# Patient Record
Sex: FEMALE | HISPANIC OR LATINO | Employment: UNEMPLOYED | URBAN - METROPOLITAN AREA
[De-identification: names, ages, dates, MRNs, and addresses within clinical notes are randomized per-mention and may not be internally consistent; named-entity substitution may affect disease eponyms.]

---

## 2017-05-02 ENCOUNTER — HOSPITAL ENCOUNTER (EMERGENCY)
Facility: CLINIC | Age: 72
Discharge: HOME | End: 2017-05-02
Attending: EMERGENCY MEDICINE | Admitting: EMERGENCY MEDICINE
Payer: MEDICARE

## 2017-05-02 VITALS
DIASTOLIC BLOOD PRESSURE: 63 MMHG | WEIGHT: 162 LBS | SYSTOLIC BLOOD PRESSURE: 148 MMHG | HEIGHT: 60 IN | BODY MASS INDEX: 31.8 KG/M2 | RESPIRATION RATE: 16 BRPM | OXYGEN SATURATION: 96 % | TEMPERATURE: 98.5 F | HEART RATE: 69 BPM

## 2017-05-02 DIAGNOSIS — M54.81 OCCIPITAL NEURALGIA OF LEFT SIDE: ICD-10-CM

## 2017-05-02 PROCEDURE — 99284 EMERGENCY DEPT VISIT MOD MDM: CPT | Mod: 25

## 2017-05-02 PROCEDURE — 25000125 ZZHC RX 250: Performed by: EMERGENCY MEDICINE

## 2017-05-02 PROCEDURE — 96372 THER/PROPH/DIAG INJ SC/IM: CPT

## 2017-05-02 RX ORDER — TRIAMCINOLONE ACETONIDE 40 MG/ML
40 INJECTION, SUSPENSION INTRA-ARTICULAR; INTRAMUSCULAR ONCE
Status: COMPLETED | OUTPATIENT
Start: 2017-05-02 | End: 2017-05-02

## 2017-05-02 RX ADMIN — TRIAMCINOLONE ACETONIDE 40 MG: 40 INJECTION, SUSPENSION INTRA-ARTICULAR; INTRAMUSCULAR at 21:04

## 2017-05-02 ASSESSMENT — ENCOUNTER SYMPTOMS
HEADACHES: 1
NECK PAIN: 1

## 2017-05-02 NOTE — ED AVS SNAPSHOT
Emergency Department    [address]    [place] [address]    Phone:  [phone]    Fax:  [phone]                                       Dea Burnette   MRN: [id number]    Department:   Emergency Department   Date of Visit:  5/2/2017           Patient Information     Date Of Birth          1945        Your diagnoses for this visit were:     Occipital neuralgia of left side        You were seen by Harlan Santoyo MD.      Follow-up Information     Schedule an appointment as soon as possible for a visit with Fanta Akhtar MD.    Specialty:  Neurology    Contact information:    [facility]  [address] [facility] [address]  [place] [address]  [phone]          Discharge Instructions         * Dolor De Laura [Headache, Unspecified]    No se ha podido determinar con certeza cuál es la causa de barnes dolor de laura de hoy, veena no parece rosalba señales de rivera enfermedad seria.  Con el estrés, algunas personas tensan los músculos de los hombros, del nadia y el cuero cabelludo sin darse cuenta. Si lo hacen gina cierto tiempo, puede darse un DOLOR DE LAURA POR TENSIÓN (TENSION HEADACHE).  El DOLOR DE LAURA POR MIGRAÑA (MIGRAINE HEADACHE) se debe a cambios en el flujo sanguíneo del cerebro. Un ataque de migraña puede desencadenarse por estrés emocional, cambios hormonales gina el ciclo menstrual, anticonceptivos orales, el consumo de alcohol, ciertos alimentos que contienen tiramina (tyramine), esfuerzo de los ojos, cambios del clima, saltearse comidas, falta de sueño o dormir demasiadas horas.  Otras causas que pueden ocasionar un dolor de laura son: rivera enfermedad viral (viral illness),rivera infección de los senos paranasales (sinus infection), del oído (ear infection) o de la garganta (throat infection), dolor en los dientes o muelas (dental pain) y dolor en la mandíbula (jaw joint pain).  Cuidados En [place]:  1. Si le dieron algún calmante (analgésico [pain  medicine]) para zaki dolor de viola, no conduzca hasta barnes casa. En cambio, coordine para que otra persona lo lleve. Cuando llegue a barnes casa, intente dormir. Se sentirá mucho mejor cuando despierte.  2. Si tiene náuseas (nausea) o vómito (vomiting), siga rivera dieta liviana hasta que el dolor de viola haya desaparecido.  3. Si tiene un dolor de viola de tipo migraña, use lentes de sol cuando salga a la maral del día o se encuentre en lugares interiores con iluminación brillante, hasta que laurel síntomas se alivien. La maral rosaline y muy brillante puede empeorar zaki tipo de dolor de viola.  Programe rivera VISITA DE CONTROL con barnes médico si no empieza a sentirse mejor gina las próximas 24 horas. Si tiene evan de viola frecuentes, debería conversar sobre un plan de tratamiento con barnes médico de atención de primaria. Si está atento a los primeros signos del dolor de viola y comienza el tratamiento de inmediato, quizás pueda detener el dolor usted mismo.  Busque Prontamente Atención Médica  si algo de lo siguiente ocurre:    El dolor de viola empeora o no se kb en las próximas 24 horas.    Vómito persistente (no puede mantener los líquidos en el estómago).    Fiebre de 101 F (38.3 C) o más emiliano.    Rigidez en el nadia.    Gran somnolencia, confusión, o desmayo.    Debilidad en un brazo o rivera pierna, o en un lado de la sawyer.    Dificultades para hablar o andrés.    [id number] [facility] [address], [place] [address]. Todos los derechos reservados. Esta información no pretende sustituir la atención médica profesional. Sólo barnes médico puede diagnosticar y tratar un problema de clyde.          24 Hour Appointment Hotline       To make an appointment at any [facility] clinic, call [phone] ([phone]). If you don't have a family doctor or clinic, we will help you find one. [facility] clinics are conveniently located to serve the needs of you and your family.             Review of your medicines       Our records show that you are taking the medicines listed below. If these are incorrect, please call your family doctor or clinic.        Dose / Directions Last dose taken    ACETAMINOPHEN PO   Dose:  500 mg        Take 500 mg by mouth every 6 hours as needed.   Refills:  0        calcium-vitamin D 600-400 MG-UNIT per tablet   Commonly known as:  CALTRATE   Dose:  1 tablet        Take 1 tablet by mouth 2 times daily.   Refills:  0        PRAVASTATIN SODIUM PO   Dose:  20 mg        Take 20 mg by mouth daily.   Refills:  0                Orders Needing Specimen Collection     None      Pending Results     No orders found from 4/30/2017 to 5/3/2017.            Pending Culture Results     No orders found from 4/30/2017 to 5/3/2017.            Pending Results Instructions     If you had any lab results that were not finalized at the time of your Discharge, you can call the ED Lab Result RN at [phone]. You will be contacted by this team for any positive Lab results or changes in treatment. The nurses are available 7 days a week from 10A to 6:30P.  You can leave a message 24 hours per day and they will return your call.        Test Results From Your Hospital Stay               Clinical Quality Measure: Blood Pressure Screening     Your blood pressure was checked while you were in the emergency department today. The last reading we obtained was  BP: 142/53 . Please read the guidelines below about what these numbers mean and what you should do about them.  If your systolic blood pressure (the top number) is less than 120 and your diastolic blood pressure (the bottom number) is less than 80, then your blood pressure is normal. There is nothing more that you need to do about it.  If your systolic blood pressure (the top number) is 120-139 or your diastolic blood pressure (the bottom number) is 80-89, your blood pressure may be higher than it should be. You should have your blood pressure rechecked within a year by a  "primary care provider.  If your systolic blood pressure (the top number) is 140 or greater or your diastolic blood pressure (the bottom number) is 90 or greater, you may have high blood pressure. High blood pressure is treatable, but if left untreated over time it can put you at risk for heart attack, stroke, or kidney failure. You should have your blood pressure rechecked by a primary care provider within the next 4 weeks.  If your provider in the emergency department today gave you specific instructions to follow-up with your doctor or provider even sooner than that, you should follow that instruction and not wait for up to 4 weeks for your follow-up visit.        Thank you for choosing [facility]       Thank you for choosing [facility] for your care. Our goal is always to provide you with excellent care. Hearing back from our patients is one way we can continue to improve our services. Please take a few minutes to complete the written survey that you may receive in the mail after you visit with us. Thank you!        [email or web address] Information     [employer] lets you send messages to your doctor, view your test results, renew your prescriptions, schedule appointments and more. To sign up, go to [email or web address] . Click on \"Log in\" on the left side of the screen, which will take you to the Welcome page. Then click on \"Sign up Now\" on the right side of the page.     You will be asked to enter the access code listed below, as well as some personal information. Please follow the directions to create your username and password.     Your access code is: [email or web address]  Expires: 2017  9:05 PM     Your access code will  in 90 days. If you need help or a new code, please call your [facility] clinic or [phone].        Care EveryWhere ID     This is your Care EveryWhere ID. This could be used by other organizations to access your [facility] medical records  [id number]        After Visit Summary       This is your " record. Keep this with you and show to your community pharmacist(s) and doctor(s) at your next visit.

## 2017-05-03 NOTE — ED NOTES
The Pt presents to the ED with c/o a left sided headache that began today at 1500 hrs. She denies photophobia or nausea. She currently rates her pain 5/10.

## 2017-05-03 NOTE — ED PROVIDER NOTES
History     Chief Complaint:  Headache       HPI History translated through the patient's daughter, present at bedside.     Dea Burnette is a 72 year old female with a history of HTN and HLD who presents for evaluation of a headache. About 4 hours ago, the patient developed a sharp, throbbing left sided headache, radiating into her left ear and the left side of her neck, prompting her visit to the ED. Here, she states that the pain is worst just behind the left ear and inhibits her ability to turn her head to the left. The patient denies any previous episodes of similar pain or recent illnesses, however the daughter mentions that the mother was previously complaining of a voice change.     Allergies:  The patient has no known drug allergies.      Medications:    Acetaminophen po  Calcium-vitamin d (caltrate) 600-400 mg-unit per tablet  Pravastatin sodium po    Past Medical History:    Back pain   Cholelithiasis   Diverticulosis   Hyperlipidemia   Hypertension    Noninfectious ileitis   Obesity   Osteoarthritis   Osteopenia   Screen for colon cancer   Thyroid function study abnormality   Vitamin D deficiency     Past Surgical History:      Phacoemulsification Clear Cornea With Standard Intraocular Lens Implant, Right x2    Family History:    History reviewed.  No significant family history.      Social History:  Relationship status:   Tobacco use: Neg  Alcohol use: Neg  The patient presents with her daughter.       Review of Systems   HENT: Positive for ear pain.    Musculoskeletal: Positive for neck pain.   Neurological: Positive for headaches.   All other systems reviewed and are negative.    Physical Exam   First Vitals:  BP: 177/65  Pulse: 70  Temp: 98.5  F (36.9  C)  Resp: 18  Height: 152.4 cm (5')  Weight: 73.5 kg (162 lb)  SpO2: 98 %    Physical Exam    GENERAL: well developed, pleasant. Looks uncomfortable  HEAD: atraumatic. Locates pain to left occipital region.    EYES: pupils  reactive, extraocular muscles intact, conjunctivae normal  ENT:  mucus membranes moist  NECK:  trachea midline, normal range of motion  RESPIRATORY: no tachypnea, breath sounds clear to auscultation   CVS: normal S1/S2, no murmurs, intact distal pulses  ABDOMEN: soft, nontender, nondistention  MUSCULOSKELETAL: no deformities  SKIN: warm and dry, no acute rashes or ulceration. No herpetic lesions  NEURO: GCS 15, cranial nerves intact, alert and oriented x3  PSYCH:  Mood/affect normal     Emergency Department Course   Interventions:  2104: Kenalog-40, 40 mg, IM    Emergency Department Course:  Nursing notes and vitals reviewed.  I performed an exam of the patient as documented above.  The above workup was undertaken.  1920: I performed an occipital nerve block.   2045: I rechecked the patient and discussed results.    Findings and plan explained to the Patient. Patient discharged home, status improved, with instructions regarding supportive care, medications, and reasons to return as well as the importance of close follow-up was reviewed.     Impression & Plan    Medical Decision Making:  Dea Burnette is a 72 year old female who presents with a headache that sounds concerning for occipital neuralgia, with point tenderness over the occipital region. She was given a test dose of marcaine with complete resolution of her symptoms. A second dose was given with triamcinolone and marcaine. Discussed outpatient management with her. Plan: follow up with her PCP and neurology and return if worse.     Diagnosis:    ICD-10-CM    1. Occipital neuralgia of left side M54.81        Disposition:  discharged to home    Deb TAN, am serving as a scribe on 5/2/2017 at 7:13 PM to personally document services performed by Harlan Santoyo MD, based on my observations and the provider's statements to me.     EMERGENCY DEPARTMENT       Harlan Santoyo MD  05/04/17 2995

## 2017-05-03 NOTE — DISCHARGE INSTRUCTIONS
* Dolor De Laura [Headache, Unspecified]    No se ha podido determinar con certeza cuál es la causa de barnes dolor de laura de jj, veena no parece rosalba señales de rivera enfermedad seria.  Con el estrés, algunas personas tensan los músculos de los hombros, del nadia y el cuero cabelludo sin darse cuenta. Si lo hacen gina cierto tiempo, puede darse un DOLOR DE LAURA POR TENSIÓN (TENSION HEADACHE).  El DOLOR DE LAURA POR MIGRAÑA (MIGRAINE HEADACHE) se debe a cambios en el flujo sanguíneo del cerebro. Un ataque de migraña puede desencadenarse por estrés emocional, cambios hormonales gina el ciclo menstrual, anticonceptivos orales, el consumo de alcohol, ciertos alimentos que contienen tiramina (tyramine), esfuerzo de los ojos, cambios del clima, saltearse comidas, falta de sueño o dormir demasiadas horas.  Otras causas que pueden ocasionar un dolor de laura son: rivera enfermedad viral (viral illness),rivera infección de los senos paranasales (sinus infection), del oído (ear infection) o de la garganta (throat infection), dolor en los dientes o muelas (dental pain) y dolor en la mandíbula (jaw joint pain).  Cuidados En [place]:  1. Si le dieron algún calmante (analgésico [pain medicine]) para zaki dolor de laura, no conduzca hasta barnes casa. En cambio, coordine para que otra persona lo lleve. Cuando llegue a barnes casa, intente dormir. Se sentirá mucho mejor cuando despierte.  2. Si tiene náuseas (nausea) o vómito (vomiting), siga rivera dieta liviana hasta que el dolor de laura haya desaparecido.  3. Si tiene un dolor de laura de tipo migraña, use lentes de sol cuando salga a la maral del día o se encuentre en lugares interiores con iluminación brillante, hasta que laurel síntomas se alivien. La maral rosaline y muy brillante puede empeorar zaki tipo de dolor de laura.  Programe rivera VISITA DE CONTROL con barnes médico si no empieza a sentirse mejor gina las próximas 24 horas. Si tiene evan de laura frecuentes, debería  conversar sobre un plan de tratamiento con barnes médico de atención de primaria. Si está atento a los primeros signos del dolor de viola y comienza el tratamiento de inmediato, quizás pueda detener el dolor usted mismo.  Busque Prontamente Atención Médica  si algo de lo siguiente ocurre:    El dolor de viola empeora o no se kb en las próximas 24 horas.    Vómito persistente (no puede mantener los líquidos en el estómago).    Fiebre de 101 F (38.3 C) o más emiliano.    Rigidez en el nadia.    Gran somnolencia, confusión, o desmayo.    Debilidad en un brazo o rivera pierna, o en un lado de la sawyer.    Dificultades para hablar o andrés.    [id number] [facility], [address] [place] [address]. Todos los derechos reservados. Esta información no pretende sustituir la atención médica profesional. Sólo barnes médico puede diagnosticar y tratar un problema de clyde.

## 2017-05-16 NOTE — ED AVS SNAPSHOT
Emergency Department    [address] [facility] [address]    Phone:  [phone]    Fax:  [phone]                                       Dea Burnette   MRN: [id number]    Department:   Emergency Department   Date of Visit:  5/2/2017           After Visit Summary Signature Page     I have received my discharge instructions, and my questions have been answered. I have discussed any challenges I see with this plan with the nurse or doctor.    ..........................................................................................................................................  Patient/Patient Representative Signature      ..........................................................................................................................................  Patient Representative Print Name and Relationship to Patient    ..................................................               ................................................  Date                                            Time    ..........................................................................................................................................  Reviewed by Signature/Title    ...................................................              ..............................................  Date                                                            Time           [phone]

## 2017-11-03 ENCOUNTER — HOSPITAL ENCOUNTER (EMERGENCY)
Facility: CLINIC | Age: 72
Discharge: HOME | End: 2017-11-03
Attending: EMERGENCY MEDICINE | Admitting: EMERGENCY MEDICINE
Payer: MEDICARE

## 2017-11-03 VITALS
DIASTOLIC BLOOD PRESSURE: 72 MMHG | OXYGEN SATURATION: 100 % | TEMPERATURE: 97.7 F | BODY MASS INDEX: 32.46 KG/M2 | SYSTOLIC BLOOD PRESSURE: 172 MMHG | WEIGHT: 166.2 LBS

## 2017-11-03 DIAGNOSIS — L03.213 PERIORBITAL CELLULITIS OF RIGHT EYE: ICD-10-CM

## 2017-11-03 PROCEDURE — 99282 EMERGENCY DEPT VISIT SF MDM: CPT

## 2017-11-03 RX ORDER — CEPHALEXIN 500 MG/1
500 CAPSULE ORAL 4 TIMES DAILY
Qty: 40 CAPSULE | Refills: 0 | Status: SHIPPED | OUTPATIENT
Start: 2017-11-03 | End: 2017-11-13

## 2017-11-03 ASSESSMENT — ENCOUNTER SYMPTOMS
EYE DISCHARGE: 0
EYE REDNESS: 1
FACIAL SWELLING: 1
SHORTNESS OF BREATH: 0
EYE ITCHING: 0
TROUBLE SWALLOWING: 0
CHILLS: 0
FEVER: 0
PHOTOPHOBIA: 0
EYE PAIN: 0

## 2017-11-03 NOTE — DISCHARGE INSTRUCTIONS
Celulitis Periorbital (Situada Alrededor De La Órbita) [Periorbital Cellulitis]  La Celulitis Periorbital es rivera infección de los tejidos alrededor del oliverio. Ocurre con mayor frecuencia debido a rivera infección bacteriana en la piel provocada por rascarse o la picadura de un insecto. A veces puede causarla rivera infección del seno nasal.   Cuidado En Casa:  1) [place] los antibióticos exactamente sin le indicaron hasta que se terminen.  2) Puede usar acetaminofén (Tylenol) o ibuprofeno (Motrin, Advil) para controlar el dolor, a menos que le receten otro medicamento. [NOTA: Si sufre de enfermedad del hígado o rinones, o alguna vez tuvo rivera úlcera estomacal o sangrado gastrointestinal, hable con barnes médico antes de usar estos medicamentos.] (La aspirina no debe usarse nunca con rivera persona morales de 18 años con fiebre. Ésta puede causar serios daños al hígado).  Seguimiento  con barnes médico o de acuerdo a las indicaciones de nuestro personal.  Regrese Inmediatamente  o contacte a barnes médico si ocurre algo de lo siguiente:  -- Aumento de la hinchazón alrededor del oliverio  -- Aumento del enrojecimiento  -- Fiebre sobre 100.5 (38  C) oral o 101.5 (38.6  C)rectal por más de dos días con antibióticos  Date Last Reviewed: 1/11/2014 2000-2017 [employer]. [address] [place] [address], [place] [address]. Todos los derechos reservados. Esta información no pretende sustituir la atención médica profesional. Sólo barnes médico puede diagnosticar y tratar un problema de clyde.

## 2017-11-03 NOTE — ED AVS SNAPSHOT
Emergency Department    [address] [facility] [address]    Phone:  [phone]    Fax:  [phone]                                       Dea Burnette   MRN: [id number]    Department:   Emergency Department   Date of Visit:  11/3/2017           After Visit Summary Signature Page     I have received my discharge instructions, and my questions have been answered. I have discussed any challenges I see with this plan with the nurse or doctor.    ..........................................................................................................................................  Patient/Patient Representative Signature      ..........................................................................................................................................  Patient Representative Print Name and Relationship to Patient    ..................................................               ................................................  Date                                            Time    ..........................................................................................................................................  Reviewed by Signature/Title    ...................................................              ..............................................  Date                                                            Time

## 2017-11-03 NOTE — ED PROVIDER NOTES
History     Chief Complaint:  Facial Swelling     HPI   Patient history translated by the patient's daughter present at bedside    Dea Burnette is a 72 year old female who presents with facial swelling. The patient reports she woke up this morning, a few hours ago, with swelling around her right eye and on the right side of her forehead. Daughter saw the patient last night and notes there was no swelling at that time. The patient reports she feels like her right eye might be red but denies any eye pain or eye drainage. She states her skin is slightly itchy and a little bit painful, but she denies severe pain. The patient reports she took Tylenol last night for a mild headache. She has taken Tylenol before without any reaction. Headache is resolved. The patient denies any other rashes. She denies any fever, eye drainage, vision changes, blurry vision, or pain with movement of her eye. She denies any mouth or lip swelling or difficulty breathing. She has no known allergies and denies any new exposures. No new medication changes. Patient notes she has a chronic scar on her right cornea from a previous cataract surgery.     Allergies:  No Known Allergies     Medications:    Acetaminophen PRN    Past Medical History:    Back pain  Diverticulosis  Hyperlipidemia  Noninfectious ileitis  Osteoarthritis    Past Surgical History:     section  Cataract    Family History:    History reviewed. No pertinent family history.     Social History:  Smoking status: No  Alcohol use: No  Marital Status:   [4]     Review of Systems   Constitutional: Negative for chills and fever.   HENT: Positive for facial swelling. Negative for trouble swallowing.    Eyes: Positive for redness (per patient ). Negative for photophobia, pain, discharge, itching and visual disturbance.   Respiratory: Negative for shortness of breath.    Skin: Negative for rash.   All other systems reviewed and are negative.      Physical Exam    Patient Vitals for the past 24 hrs:   BP Temp Temp src Heart Rate SpO2 Weight   11/03/17 0756 172/72 97.7  F (36.5  C) Oral 66 100 % 75.4 kg (166 lb 3.2 oz)       Physical Exam  General: Resting comfortably on the gurney  Eyes:  Swelling to the right forehead and upper right eyelid    Conjunctivae is clear    There is a corneal scar on the right    Mild erythema to the lower eyelid on the medial aspect    No drainage from the eye    Normal extraocular movements.     The pupils are equal and round  ENT:    The nose is normal    Pinnae are normal    The oropharynx is normal  CV:  Regular rate and rhythm     No edema  Resp:  Lungs are clear    Non-labored    No rales    No wheezing   GI:  Abdomen is soft, there is no rigidity    No distension    No rebound tenderness   MS:  Normal muscular tone    No asymmetric leg swelling  Skin:  See eye above  Neuro:   Awake, alert.      Speech is normal and fluent.    Face is symmetric.     Moves all extremities    Emergency Department Course   Emergency Department Course:  Past medical records, nursing notes, and vitals reviewed.  0812: I performed an exam of the patient and obtained history, as documented above.    I rechecked the patient.  Findings and plan explained to the Patient. Patient discharged home with instructions regarding supportive care, medications, and reasons to return. The importance of close follow-up was reviewed.     Impression & Plan      Medical Decision Making:  Dea is a 72 year old female who presents to the emergency department with swelling around her right eye and forehead. She woke up with that this morning and was normal when she went to bed last night. She has no past medical history and does not take any medications regularly per her history. She does take Tylenol occasionally for headaches. She denies any eye pain, but does note that she thinks it may be red. She does not notice any drainage from the eye. She denies any fever and is  otherwise feeling well. There is slight pain in the area of the swelling. On exam she has swelling of the right side of the forehead as well as edema of the upper eyelid. There is a mild amount of redness in the medial lower eyelid. Her conjunctiva is clear. There is no drainage present. She does appear to have some scarring on her cornea. Her extraocular movements are intact. Minimal tenderness in the forehead with palpation. She overall appears well here. I think it is likely that she has a preseptal or periorbital cellulitis occurring. Will treat with Keflex. No evidence of conjunctivas or orbital cellulitis at this time. Follow up with primary care provider in two days. Return to the emergency department with any other new or concerning symptoms. She should return to the ED if her symptoms are worsening. She is also instructed to try using Benadryl or Zyrtec at home to help with the swelling.          Diagnosis:    ICD-10-CM   1. Periorbital cellulitis of right eye L03.213     Disposition: Discharged to home    Discharge Medications:   Details   cephALEXin (KEFLEX) 500 MG capsule Take 1 capsule (500 mg) by mouth 4 times daily for 10 days, Disp-40 capsule, R-0, Local Print     Rick Bianchi  11/3/2017    EMERGENCY DEPARTMENT    Rick TAN am serving as a scribe at 8:12 AM on 11/3/2017 to document services personally performed by Burke Camargo MD based on my observations and the provider's statements to me.      Burke Camargo MD  11/03/17 4085

## 2017-11-03 NOTE — ED AVS SNAPSHOT
Emergency Department    [address] [facility] [address]    Phone:  [phone]    Fax:  [phone]                                       Dea Burnette   MRN: [id number]    Department:   Emergency Department   Date of Visit:  11/3/2017           Patient Information     Date Of Birth          1945        Your diagnoses for this visit were:     Periorbital cellulitis of right eye        You were seen by Burke Camargo MD.      Follow-up Information     Follow up with [facility], [facility] In 2 days.    Contact information:    [address] [address]  [phone]          Follow up with  Emergency Department.    Specialty:  EMERGENCY MEDICINE    Why:  As needed    Contact information:    [address] [facility] [address] [phone]        Discharge Instructions         Celulitis Periorbital (Situada Alrededor De La Órbita) [Periorbital Cellulitis]  La Celulitis Periorbital es rivera infección de los tejidos alrededor del oliverio. Ocurre con mayor frecuencia debido a rivera infección bacteriana en la piel provocada por rascarse o la picadura de un insecto. A veces puede causarla rivera infección del seno nasal.   Cuidado En Casa:  1) [place] los antibióticos exactamente sin le indicaron hasta que se terminen.  2) Puede usar acetaminofén (Tylenol) o ibuprofeno (Motrin, Advil) para controlar el dolor, a menos que le receten otro medicamento. [NOTA: Si sufre de enfermedad del hígado o rinones, o alguna vez tuvo rivera úlcera estomacal o sangrado gastrointestinal, hable con barnes médico antes de usar estos medicamentos.] (La aspirina no debe usarse nunca con rivera persona morales de 18 años con fiebre. Ésta puede causar serios daños al hígado).  Seguimiento  con barnes médico o de acuerdo a las indicaciones de nuestro personal.  Regrese Inmediatamente  o contacte a barnes médico si ocurre algo de lo siguiente:  -- Aumento de la hinchazón alrededor del oliverio  -- Aumento  del enrojecimiento  -- Fiebre sobre 100.5 (38  C) oral o 101.5 (38.6  C)rectal por más de dos días con antibióticos  Date Last Reviewed: 1/11/2014 2000-2017 [employer]. [address] [place] [id number]. Todos los derechos reservados. Esta información no pretende sustituir la atención médica profesional. Sólo barnes médico puede diagnosticar y tratar un problema de clyde.          24 Hour Appointment Hotline       To make an appointment at any [facility], call [phone] ([phone]). If you don't have a family doctor or clinic, we will help you find one. [facility] clinics are conveniently located to serve the needs of you and your family.             Review of your medicines      START taking        Dose / Directions Last dose taken    cephALEXin 500 MG capsule   Commonly known as:  KEFLEX   Dose:  500 mg   Quantity:  40 capsule        Take 1 capsule (500 mg) by mouth 4 times daily for 10 days   Refills:  0          Our records show that you are taking the medicines listed below. If these are incorrect, please call your family doctor or clinic.        Dose / Directions Last dose taken    ACETAMINOPHEN PO   Dose:  500 mg        Take 500 mg by mouth every 6 hours as needed.   Refills:  0        calcium-vitamin D 600-400 MG-UNIT per tablet   Commonly known as:  CALTRATE   Dose:  1 tablet        Take 1 tablet by mouth 2 times daily.   Refills:  0        PRAVASTATIN SODIUM PO   Dose:  20 mg        Take 20 mg by mouth daily.   Refills:  0                Prescriptions were sent or printed at these locations (1 Prescription)                   Other Prescriptions                Printed at Department/Unit printer (1 of 1)         cephALEXin (KEFLEX) 500 MG capsule                Orders Needing Specimen Collection     None      Pending Results     No orders found from 11/1/2017 to 11/4/2017.            Pending Culture Results     No orders found from 11/1/2017 to 11/4/2017.            Pending  Results Instructions     If you had any lab results that were not finalized at the time of your Discharge, you can call the ED Lab Result RN at [phone]. You will be contacted by this team for any positive Lab results or changes in treatment. The nurses are available 7 days a week from 10A to 6:30P.  You can leave a message 24 hours per day and they will return your call.        Test Results From Your Hospital Stay               Clinical Quality Measure: Blood Pressure Screening     Your blood pressure was checked while you were in the emergency department today. The last reading we obtained was  BP: 172/72 . Please read the guidelines below about what these numbers mean and what you should do about them.  If your systolic blood pressure (the top number) is less than 120 and your diastolic blood pressure (the bottom number) is less than 80, then your blood pressure is normal. There is nothing more that you need to do about it.  If your systolic blood pressure (the top number) is 120-139 or your diastolic blood pressure (the bottom number) is 80-89, your blood pressure may be higher than it should be. You should have your blood pressure rechecked within a year by a primary care provider.  If your systolic blood pressure (the top number) is 140 or greater or your diastolic blood pressure (the bottom number) is 90 or greater, you may have high blood pressure. High blood pressure is treatable, but if left untreated over time it can put you at risk for heart attack, stroke, or kidney failure. You should have your blood pressure rechecked by a primary care provider within the next 4 weeks.  If your provider in the emergency department today gave you specific instructions to follow-up with your doctor or provider even sooner than that, you should follow that instruction and not wait for up to 4 weeks for your follow-up visit.        Thank you for choosing [facility]       Thank you for choosing [facility] for your care.  "Our goal is always to provide you with excellent care. Hearing back from our patients is one way we can continue to improve our services. Please take a few minutes to complete the written survey that you may receive in the mail after you visit with us. Thank you!        [email or web address] Information     [email or web address] lets you send messages to your doctor, view your test results, renew your prescriptions, schedule appointments and more. To sign up, go to [email or web address] . Click on \"Log in\" on the left side of the screen, which will take you to the Welcome page. Then click on \"Sign up Now\" on the right side of the page.     You will be asked to enter the access code listed below, as well as some personal information. Please follow the directions to create your username and password.     Your access code is: [email or web address]  Expires: 2018  8:29 AM     Your access code will  in 90 days. If you need help or a new code, please call your [facility] clinic or [phone].        Care EveryWhere ID     This is your Care EveryWhere ID. This could be used by other organizations to access your [facility] medical records  [id number]        Equal Access to Services     KALINA ELLER : Peter Phelan, josiah jacobson, herbert bowen, belen lamar. So [facility] [phone].    ATENCIÓN: Si habla español, tiene a barnes disposición servicios gratuitos de asistencia lingüística. Tani al [phone].    We comply with applicable federal civil rights laws and [place] laws. We do not discriminate on the basis of race, color, national origin, age, disability, sex, sexual orientation, or gender identity.            After Visit Summary       This is your record. Keep this with you and show to your community pharmacist(s) and doctor(s) at your next visit.                  "

## 2019-01-29 ENCOUNTER — HOSPITAL ENCOUNTER (EMERGENCY)
Facility: CLINIC | Age: 74
Discharge: HOME | End: 2019-01-30
Attending: EMERGENCY MEDICINE | Admitting: EMERGENCY MEDICINE
Payer: MEDICARE

## 2019-01-29 ENCOUNTER — APPOINTMENT (OUTPATIENT)
Dept: CT IMAGING | Facility: CLINIC | Age: 74
End: 2019-01-29
Payer: MEDICARE

## 2019-01-29 DIAGNOSIS — K80.20 CALCULUS OF GALLBLADDER WITHOUT CHOLECYSTITIS WITHOUT OBSTRUCTION: ICD-10-CM

## 2019-01-29 DIAGNOSIS — R10.13 ABDOMINAL PAIN, EPIGASTRIC: ICD-10-CM

## 2019-01-29 DIAGNOSIS — R07.89 BURNING CHEST PAIN: ICD-10-CM

## 2019-01-29 DIAGNOSIS — I25.10 CORONARY ARTERY CALCIFICATION SEEN ON CAT SCAN: ICD-10-CM

## 2019-01-29 LAB
ALBUMIN SERPL-MCNC: 3.6 G/DL (ref 3.4–5)
ALP SERPL-CCNC: 91 U/L (ref 40–150)
ALT SERPL W P-5'-P-CCNC: 37 U/L (ref 0–50)
ANION GAP SERPL CALCULATED.3IONS-SCNC: 6 MMOL/L (ref 3–14)
AST SERPL W P-5'-P-CCNC: 24 U/L (ref 0–45)
BASOPHILS # BLD AUTO: 0 10E9/L (ref 0–0.2)
BASOPHILS NFR BLD AUTO: 0.4 %
BILIRUB SERPL-MCNC: 0.2 MG/DL (ref 0.2–1.3)
BUN SERPL-MCNC: 20 MG/DL (ref 7–30)
CALCIUM SERPL-MCNC: 8.7 MG/DL (ref 8.5–10.1)
CHLORIDE SERPL-SCNC: 104 MMOL/L (ref 94–109)
CO2 SERPL-SCNC: 29 MMOL/L (ref 20–32)
CREAT SERPL-MCNC: 0.67 MG/DL (ref 0.52–1.04)
DIFFERENTIAL METHOD BLD: NORMAL
EOSINOPHIL # BLD AUTO: 0.2 10E9/L (ref 0–0.7)
EOSINOPHIL NFR BLD AUTO: 2.9 %
ERYTHROCYTE [DISTWIDTH] IN BLOOD BY AUTOMATED COUNT: 13.4 % (ref 10–15)
GFR SERPL CREATININE-BSD FRML MDRD: 87 ML/MIN/{1.73_M2}
GLUCOSE SERPL-MCNC: 98 MG/DL (ref 70–99)
HCT VFR BLD AUTO: 37.6 % (ref 35–47)
HEMOCCULT STL QL: NEGATIVE
HGB BLD-MCNC: 12.4 G/DL (ref 11.7–15.7)
IMM GRANULOCYTES # BLD: 0 10E9/L (ref 0–0.4)
IMM GRANULOCYTES NFR BLD: 0.1 %
INTERPRETATION ECG - MUSE: NORMAL
LIPASE SERPL-CCNC: 196 U/L (ref 73–393)
LYMPHOCYTES # BLD AUTO: 1.9 10E9/L (ref 0.8–5.3)
LYMPHOCYTES NFR BLD AUTO: 24.3 %
MCH RBC QN AUTO: 30 PG (ref 26.5–33)
MCHC RBC AUTO-ENTMCNC: 33 G/DL (ref 31.5–36.5)
MCV RBC AUTO: 91 FL (ref 78–100)
MONOCYTES # BLD AUTO: 0.5 10E9/L (ref 0–1.3)
MONOCYTES NFR BLD AUTO: 6.1 %
NEUTROPHILS # BLD AUTO: 5.3 10E9/L (ref 1.6–8.3)
NEUTROPHILS NFR BLD AUTO: 66.2 %
NRBC # BLD AUTO: 0 10*3/UL
NRBC BLD AUTO-RTO: 0 /100
PLATELET # BLD AUTO: 234 10E9/L (ref 150–450)
POTASSIUM SERPL-SCNC: 3.6 MMOL/L (ref 3.4–5.3)
PROT SERPL-MCNC: 7.9 G/DL (ref 6.8–8.8)
RBC # BLD AUTO: 4.13 10E12/L (ref 3.8–5.2)
SODIUM SERPL-SCNC: 139 MMOL/L (ref 133–144)
TROPONIN I SERPL-MCNC: <0.015 UG/L (ref 0–0.04)
WBC # BLD AUTO: 7.9 10E9/L (ref 4–11)

## 2019-01-29 PROCEDURE — 80053 COMPREHEN METABOLIC PANEL: CPT | Performed by: EMERGENCY MEDICINE

## 2019-01-29 PROCEDURE — 74177 CT ABD & PELVIS W/CONTRAST: CPT

## 2019-01-29 PROCEDURE — 84484 ASSAY OF TROPONIN QUANT: CPT | Performed by: EMERGENCY MEDICINE

## 2019-01-29 PROCEDURE — 25000125 ZZHC RX 250: Performed by: EMERGENCY MEDICINE

## 2019-01-29 PROCEDURE — 71260 CT THORAX DX C+: CPT

## 2019-01-29 PROCEDURE — 99285 EMERGENCY DEPT VISIT HI MDM: CPT | Mod: 25

## 2019-01-29 PROCEDURE — 25000128 H RX IP 250 OP 636: Performed by: EMERGENCY MEDICINE

## 2019-01-29 PROCEDURE — 93005 ELECTROCARDIOGRAM TRACING: CPT

## 2019-01-29 PROCEDURE — 82272 OCCULT BLD FECES 1-3 TESTS: CPT | Performed by: EMERGENCY MEDICINE

## 2019-01-29 PROCEDURE — C9113 INJ PANTOPRAZOLE SODIUM, VIA: HCPCS | Performed by: EMERGENCY MEDICINE

## 2019-01-29 PROCEDURE — 83690 ASSAY OF LIPASE: CPT | Performed by: EMERGENCY MEDICINE

## 2019-01-29 PROCEDURE — 85025 COMPLETE CBC W/AUTO DIFF WBC: CPT | Performed by: EMERGENCY MEDICINE

## 2019-01-29 RX ORDER — ONDANSETRON 2 MG/ML
4 INJECTION INTRAMUSCULAR; INTRAVENOUS EVERY 30 MIN PRN
Status: DISCONTINUED | OUTPATIENT
Start: 2019-01-29 | End: 2019-01-30 | Stop reason: HOSPADM

## 2019-01-29 RX ORDER — IOPAMIDOL 755 MG/ML
81 INJECTION, SOLUTION INTRAVASCULAR ONCE
Status: COMPLETED | OUTPATIENT
Start: 2019-01-29 | End: 2019-01-29

## 2019-01-29 RX ADMIN — SODIUM CHLORIDE 80 ML: 9 INJECTION, SOLUTION INTRAVENOUS at 22:13

## 2019-01-29 RX ADMIN — PANTOPRAZOLE SODIUM 40 MG: 40 INJECTION, POWDER, FOR SOLUTION INTRAVENOUS at 21:25

## 2019-01-29 RX ADMIN — IOPAMIDOL 81 ML: 755 INJECTION, SOLUTION INTRAVENOUS at 22:13

## 2019-01-29 NOTE — ED AVS SNAPSHOT
Emergency Department  [address] [facility] [address]  Phone:  [phone]  Fax:  [phone]                                    Dea Burnette   MRN: [id number]    Department:   Emergency Department   Date of Visit:  1/29/2019           After Visit Summary Signature Page    I have received my discharge instructions, and my questions have been answered. I have discussed any challenges I see with this plan with the nurse or doctor.    ..........................................................................................................................................  Patient/Patient Representative Signature      ..........................................................................................................................................  Patient Representative Print Name and Relationship to Patient    ..................................................               ................................................  Date                                   Time    ..........................................................................................................................................  Reviewed by Signature/Title    ...................................................              ..............................................  Date                                               Time          22EPIC Rev 08/18

## 2019-01-30 VITALS
HEART RATE: 63 BPM | WEIGHT: 162 LBS | OXYGEN SATURATION: 98 % | RESPIRATION RATE: 14 BRPM | TEMPERATURE: 98.2 F | SYSTOLIC BLOOD PRESSURE: 155 MMHG | BODY MASS INDEX: 31.64 KG/M2 | DIASTOLIC BLOOD PRESSURE: 67 MMHG

## 2019-01-30 RX ORDER — ONDANSETRON 4 MG/1
4-8 TABLET, ORALLY DISINTEGRATING ORAL EVERY 8 HOURS PRN
Qty: 30 TABLET | Refills: 0 | Status: SHIPPED | OUTPATIENT
Start: 2019-01-30 | End: 2019-02-02

## 2019-01-30 RX ORDER — OMEPRAZOLE 40 MG/1
40 CAPSULE, DELAYED RELEASE ORAL DAILY
Qty: 30 CAPSULE | Refills: 0 | Status: SHIPPED | OUTPATIENT
Start: 2019-01-30 | End: 2019-03-01

## 2019-01-30 NOTE — ED NOTES
Assumed care at this time.   Pt is resting in bed comfortably. CP feels better following Protonix. VSS. NSR on ECG continuous monitoring.   Rachel Tolentino RN,.......................................... 1/29/2019   11:21 PM

## 2019-01-30 NOTE — ED NOTES
Bed: ED21  Expected date:   Expected time:   Means of arrival:   Comments:  Contact clean.  Housekeeping notified.

## 2019-01-30 NOTE — DISCHARGE INSTRUCTIONS
*Get plenty of rest and avoid strenuous activities.  *Take medications as prescribed. Omeprazole. Zofran for nausea.   *Follow-up with your doctor for a recheck within 12-36 hours.  A stress test referral has been made.  Recommend follow-up endoscopy.  A referral has been made.  *Return to the ER if you develop fever, worsening pain, pain that moves to the right lower abdomen, faint or feel like you will faint or become worse in any way.    Discharge Instructions  Chest Pain    You have been seen today for chest pain or discomfort.  At this time, your provider has found no signs that your chest pain is due to a serious or life-threatening condition, (or you have declined more testing and/or admission to the hospital). However, sometimes there is a serious problem that does not show up right away. Your evaluation today may not be complete and you may need further testing and evaluation.     Generally, every Emergency Department visit should have a follow-up clinic visit with either a primary or a specialty clinic/provider. Please follow-up as instructed by your emergency provider today.  Return to the Emergency Department if:  Your chest pain changes, gets worse, starts to happen more often, or comes with less activity.  You are newly short of breath.  You get very weak or tired.  You pass out or faint.  You have any new symptoms, like fever, cough, numb legs, or you cough up blood.  You have anything else that worries you.    Until you follow-up with your regular provider, please do the following:  Take one aspirin daily unless you have an allergy or are told not to by your provider.  If a stress test appointment has been made, go to the appointment.  If you have questions, contact your regular provider.  Follow-up with your regular provider/clinic as directed; this is very important.    If you were given a prescription for medicine here today, be sure to read all of the information (including the package insert)  that comes with your prescription.  This will include important information about the medicine, its side effects, and any warnings that you need to know about.  The pharmacist who fills the prescription can provide more information and answer questions you may have about the medicine.  If you have questions or concerns that the pharmacist cannot address, please call or return to the Emergency Department.       Remember that you can always come back to the Emergency Department if you are not able to see your regular provider in the amount of time listed above, if you get any new symptoms, or if there is anything that worries you.  Discharge Instructions  Abdominal Pain    Abdominal pain (belly pain) can be caused by many things. Your evaluation today does not show the exact cause for your pain. Your provider today has decided that it is unlikely your pain is due to a life threatening problem, or a problem requiring surgery or hospital admission. Sometimes those problems cannot be found right away, so it is very important that you follow up as directed.  Sometimes only the changes which occur over time allow the cause of your pain to be found.    Generally, every Emergency Department visit should have a follow-up clinic visit with either a primary or a specialty clinic/provider. Please follow-up as instructed by your emergency provider today. With abdominal pain, we often recommend very close follow-up, such as the following day.    ADULTS:  Return to the Emergency Department right away if:    You get an oral temperature above 102oF or as directed by your provider.  You have blood in your stools. This may be bright red or appear as black, tarry stools.    You keep vomiting (throwing up) or cannot drink liquids.  You see blood when you vomit.   You cannot have a bowel movement or you cannot pass gas.  Your stomach gets bloated or bigger.  Your skin or the whites of your eyes look yellow.  You faint.  You have bloody,  frequent or painful urination (peeing).  You have new symptoms or anything that worries you.    CHILDREN:  Return to the Emergency Department right away if your child has any of the above-listed symptoms or the following:    Pushes your hand away or screams/cries when his/her belly is touched.  You notice your child is very fussy or weak.  Your child is very tired and is too tired to eat or drink.  Your child is dehydrated.  Signs of dehydration can be:  Significant change in the amount of wet diapers/urine.  Your infant or child starts to have dry mouth and lips, or no saliva (spit) or tears.    PREGNANT WOMEN:  Return to the Emergency Department right away if you have any of the above-listed symptoms or the following:    You have bleeding, leaking fluid or passing tissue from the vagina.  You have worse pain or cramping, or pain in your shoulder or back.  You have vomiting that will not stop.  You have a temperature of 100oF or more.  Your baby is not moving as much as usual.  You faint.  You get a bad headache with or without eye problems and abdominal pain.  You have a seizure.  You have unusual discharge from your vagina and abdominal pain.    Abdominal pain is pretty common during pregnancy.  Your pain may or may not be related to your pregnancy. You should follow-up closely with your OB provider so they can evaluate you and your baby.  Until you follow-up with your regular provider, do the following:     Avoid sex and do not put anything in your vagina.  Drink clear fluids.  Only take medications approved by your provider.    MORE INFORMATION:    Appendicitis:  A possible cause of abdominal pain in any person who still has their appendix is acute appendicitis. Appendicitis is often hard to diagnose.  Testing does not always rule out early appendicitis or other causes of abdominal pain. Close follow-up with your provider and re-evaluations may be needed to figure out the reason for your abdominal  "pain.    Follow-up:  It is very important that you make an appointment with your clinic and go to the appointment.  If you do not follow-up with your primary provider, it may result in missing an important development which could result in permanent injury or disability and/or lasting pain.  If there is any problem keeping your appointment, call your provider or return to the Emergency Department.    Medications:  Take your medications as directed by your provider today.  Before using over-the-counter medications, ask your provider and make sure to take the medications as directed.  If you have any questions about medications, ask your provider.    Diet:  Resume your normal diet as much as possible, but do not eat fried, fatty or spicy foods while you have pain.  Do not drink alcohol or have caffeine.  Do not smoke tobacco.    Probiotics: If you have been given an antibiotic, you may want to also take a probiotic pill or eat yogurt with live cultures. Probiotics have \"good bacteria\" to help your intestines stay healthy. Studies have shown that probiotics help prevent diarrhea (loose stools) and other intestine problems (including C. diff infection) when you take antibiotics. You can buy these without a prescription in the pharmacy section of the store.     If you were given a prescription for medicine here today, be sure to read all of the information (including the package insert) that comes with your prescription.  This will include important information about the medicine, its side effects, and any warnings that you need to know about.  The pharmacist who fills the prescription can provide more information and answer questions you may have about the medicine.  If you have questions or concerns that the pharmacist cannot address, please call or return to the Emergency Department.       Remember that you can always come back to the Emergency Department if you are not able to see your regular provider in the amount " of time listed above, if you get any new symptoms, or if there is anything that worries you.

## 2019-01-30 NOTE — ED PROVIDER NOTES
History     Chief Complaint:  Chest Pain     HPI   Dea Burnette is a 74 year old female who presents to the emergency department today for evaluation of chest pain.  She is [place]-speaking but declines a telephone  and asked for her daughter to interpret for her.  She relates that she has had epigastric abdominal pain for the past 4 weeks that is worsened over the past 2 weeks.  Its constant but worsens after she eats and she has burning discomfort like heartburn in her chest after eating.  Yesterday and today she had approximately 2 episodes of emesis that were orange or yellow in color.  She took some Maalox and then noted today she had 2 episodes of stools that were black today.  No maroon blood in her stool and no red blood in her emesis.  No coffee grounds in her emesis.  No associated shortness of breath, diaphoresis, lightheadedness or other symptoms.  No known history of ulcer.  Daughter states she takes a lot of herbal supplements and over-the-counter vitamins for her symptoms.  She was living in [place] until 2 years ago and did know she had gallstones.  At times she has right upper quadrant pain that is similar to gallstone attacks but she is not having that right now.  She has noted some lower abdominal pain over the past few days.  No fevers or urinary symptoms.  She has not seen a cardiologist.  She denies any history of coronary artery disease.  She has had a stress test in November 2016 which was read as normal.  No pleuritic chest pain. No history of cancer, DVT/PE, hemoptysis, smoking, hormone use, lower extremity symptoms, recent immobilizations.    Allergies:  NKDA    Medications:    Pravastatin    Past Medical History:    Hyperlipidemia  Back Pain  Osteoarthritis  Depression  Hypothyroidism    Past Surgical History:    C section  Phacoemulsification clear cornea w/ standard IOL implant x2  Appendectomy    Family History:    Cancer    Social History:  Marital Status:    [4]  Negative for tobacco use.  Negative for alcohol use.     Review of Systems  As noted per HPI.  Remainder of a 10 point review of systems was negative.      Physical Exam     Patient Vitals for the past 24 hrs:   BP Temp Temp src Pulse Heart Rate Resp SpO2 Weight   01/29/19 2330 149/62 -- -- 64 64 11 100 % --   01/29/19 2300 -- -- -- -- -- -- 98 % --   01/29/19 2001 178/66 98.2  F (36.8  C) Oral -- 69 16 99 % 73.5 kg (162 lb)     Physical Exam  General: Well-nourished, appears to be resting comfortably when I enter the room  Eyes: PERRL, conjunctivae pink no scleral icterus or conjunctival injection  ENT:  Moist mucus membranes, posterior oropharynx clear without erythema or exudates  Respiratory:  Lungs clear to auscultation bilaterally, no crackles/rubs/wheezes.  Good air movement  CV: Normal rate and rhythm, no murmurs/rubs/gallops  GI:  Abdomen soft and non-distended.  Normoactive BS.  Mild epigastric and mild right lower quadrant no tenderness, no guarding or rebound  Rectal: Small dark stool/mucus in vault.  No gross blood.   Skin: Warm, dry.  No rashes or petechiae  Musculoskeletal: No peripheral edema or calf tenderness  Neuro: Alert and oriented to person/place/time  Psychiatric: Normal affect      Emergency Department Course   ECG:  Indication: Chest Pain  Time: 2009  Vent. Rate 64 bpm. NC interval 168. QRS duration 82. QT/QTc 428/441. P-R-T axis 49 23 52.  Normal sinus rhythm. Normal ECG. Read time: 2047.    Imaging:  Radiographic findings were communicated with the patient who voiced understanding of the findings.  CT Chest w/ IV contrast:   1. No evidence for pulmonary embolism.  2. Vascular calcifications, including coronary artery calcification.  3. Cholelithiasis.  4. Diverticulosis descending colon, as per radiology.    Laboratory:  CBC: WBC: 7.9, HGB: 12.4, PLT: 234  CMP: All WNL (Creatinine: 0.67)  2103 Troponin: <0.015   Lipase: 196  Occult blood stool:  Negative    Interventions:  2125: Protonix 40 mg IV push injection    Emergency Department Course:      (2008) EKG obtained in the ED, see results above.     (2035) Nursing notes and vitals reviewed. I performed an exam of the patient as documented above.      (2103) IV inserted. Medicine administered as documented above. Blood drawn. This was sent to the lab for further testing, results above.     (2156) The patient was sent for a chest CT while in the emergency department, findings above.      (0012) I rechecked the patient and discussed the results of her workup thus far.      Findings and plan explained to the Patient. Patient discharged home with instructions regarding supportive care, medications, and reasons to return. The importance of close follow-up was reviewed. The patient was prescribed Omeprazole and Zofran.      I personally reviewed the laboratory results with the Patient and answered all related questions prior to discharge.     Impression & Plan      Medical Decision Making:  Dea Burnette is a 74 year old female who presents with upper abdominal pain, mild right lower abdominal pain and radiating heartburn symtoms.  A broad differential diagnosis was considered including ACS vs gastritis versus GERD versus peptic ulcer disease vs pancreatitis vs perforated viscus vs bilary colic vs obstruction vs colitis.  Lipase is normal so I doubt pancreatitis.  No RUQ pain or elevated of biliary or liver enzymes so I doubt biliary colic.  I did discuss with her that she has gallstones seen on her CT scan but no signs of acute cholecystitis.  She has no pain consistent with gallstone pain at this point though she has had it in the past.  Abdominal CT shows no signs of intrabdominal free air, colitis or obstruction.  EKG is nonischemic.  She has a normal troponin despite the duration of her symptoms.  Her symptoms will be very atypical for acute coronary syndrome.  However given her age and the coronary  artery calcifications on her CT scan I think she should be due for another stress test.  This was ordered as an outpatient.  Her Hemoccult was negative today.  Her hemoglobin is normal.  I suspect the black stools are actually due to her taking Maalox earlier.  I do not believe she has an active GI bleed at this time.  CT through the chest shows no evidence of PE, pneumonia, pneumothorax or aortic abnormality.  I suspect her symptoms are due to PUD vs gastritis with associated GERD.  She is at high risk given that she lived in [place] until recently.  In addition to the outpatient stress test I did order a referral for outpatient endoscopy.    The workup in the ED is at this point negative.  No etiology for the patients pain is found at this point and my suspicion of an intraabdominal catastrophe or other worrisome etiology is very low.  I will not therefore admit for serial exams and further workup.  Patient is hemodynamically stable in ED. Plan is home with 12 hour abdominal pain recheck by primary care physician or return to ED at that time.  Return for fevers greater than 102, increasing pain, other new symptoms develop.  Abdominal pain handout given.  Questions were answered.       Diagnosis:    ICD-10-CM   1. Abdominal pain, epigastric R10.13   2. Calculus of gallbladder without cholecystitis without obstruction K80.20   3. Coronary artery calcification seen on CAT scan I25.10   4. Burning chest pain R07.89     Disposition:  discharged to home    Discharge Medications:     Medication List      Started    omeprazole 40 MG DR capsule  Commonly known as:  priLOSEC  40 mg, Oral, DAILY     ondansetron 4 MG ODT tab  Commonly known as:  ZOFRAN ODT  4-8 mg, Oral, EVERY 8 HOURS DARRYNN              Carine Toscano  1/29/2019    EMERGENCY DEPARTMENT       Santa Kilgore MD  01/30/19 0232

## 2019-02-13 ENCOUNTER — HOSPITAL ENCOUNTER (OUTPATIENT)
Dept: CARDIOLOGY | Facility: CLINIC | Age: 74
Discharge: HOME | End: 2019-02-13
Attending: EMERGENCY MEDICINE | Admitting: EMERGENCY MEDICINE
Payer: MEDICARE

## 2019-02-13 DIAGNOSIS — I25.10 CORONARY ARTERY CALCIFICATION SEEN ON CAT SCAN: ICD-10-CM

## 2019-02-13 PROCEDURE — 25500064 ZZH RX 255 OP 636: Performed by: EMERGENCY MEDICINE

## 2019-02-13 PROCEDURE — 93350 STRESS TTE ONLY: CPT | Mod: 26 | Performed by: INTERNAL MEDICINE

## 2019-02-13 PROCEDURE — 93321 DOPPLER ECHO F-UP/LMTD STD: CPT | Mod: 26 | Performed by: INTERNAL MEDICINE

## 2019-02-13 PROCEDURE — 93018 CV STRESS TEST I&R ONLY: CPT | Performed by: INTERNAL MEDICINE

## 2019-02-13 PROCEDURE — 93016 CV STRESS TEST SUPVJ ONLY: CPT | Performed by: INTERNAL MEDICINE

## 2019-02-13 PROCEDURE — 40000264 ECHO STRESS ECHOCARDIOGRAM

## 2019-02-13 PROCEDURE — 93325 DOPPLER ECHO COLOR FLOW MAPG: CPT | Mod: 26 | Performed by: INTERNAL MEDICINE

## 2019-02-13 RX ADMIN — HUMAN ALBUMIN MICROSPHERES AND PERFLUTREN 9 ML: 10; .22 INJECTION, SOLUTION INTRAVENOUS at 15:41

## 2019-02-15 ENCOUNTER — MEDICAL CORRESPONDENCE (OUTPATIENT)
Dept: HEALTH INFORMATION MANAGEMENT | Facility: CLINIC | Age: 74
End: 2019-02-15

## 2019-03-25 ENCOUNTER — MEDICAL CORRESPONDENCE (OUTPATIENT)
Dept: HEALTH INFORMATION MANAGEMENT | Facility: CLINIC | Age: 74
End: 2019-03-25

## 2019-03-25 DIAGNOSIS — I25.10 CORONARY ATHEROSCLEROSIS OF NATIVE CORONARY ARTERY: Primary | ICD-10-CM

## 2019-03-28 DIAGNOSIS — I25.10 CORONARY ATHEROSCLEROSIS OF NATIVE CORONARY ARTERY: Primary | ICD-10-CM

## 2019-04-02 ENCOUNTER — HOSPITAL ENCOUNTER (OUTPATIENT)
Dept: CARDIOLOGY | Facility: CLINIC | Age: 74
Discharge: HOME | End: 2019-04-02
Admitting: PEDIATRICS
Payer: MEDICARE

## 2019-04-02 VITALS — SYSTOLIC BLOOD PRESSURE: 128 MMHG | DIASTOLIC BLOOD PRESSURE: 82 MMHG

## 2019-04-02 DIAGNOSIS — I25.10 CORONARY ATHEROSCLEROSIS OF NATIVE CORONARY ARTERY: ICD-10-CM

## 2019-04-02 PROCEDURE — 93017 CV STRESS TEST TRACING ONLY: CPT

## 2019-04-02 PROCEDURE — 25000128 H RX IP 250 OP 636: Performed by: INTERNAL MEDICINE

## 2019-04-02 PROCEDURE — 93018 CV STRESS TEST I&R ONLY: CPT | Performed by: INTERNAL MEDICINE

## 2019-04-02 PROCEDURE — 34300033 ZZH RX 343: Performed by: INTERNAL MEDICINE

## 2019-04-02 PROCEDURE — 78452 HT MUSCLE IMAGE SPECT MULT: CPT | Mod: 26 | Performed by: INTERNAL MEDICINE

## 2019-04-02 PROCEDURE — 93016 CV STRESS TEST SUPVJ ONLY: CPT | Performed by: INTERNAL MEDICINE

## 2019-04-02 PROCEDURE — A9502 TC99M TETROFOSMIN: HCPCS | Performed by: INTERNAL MEDICINE

## 2019-04-02 RX ORDER — ACYCLOVIR 200 MG/1
0-1 CAPSULE ORAL
Status: DISCONTINUED | OUTPATIENT
Start: 2019-04-02 | End: 2019-04-03 | Stop reason: HOSPADM

## 2019-04-02 RX ORDER — ALBUTEROL SULFATE 90 UG/1
2 AEROSOL, METERED RESPIRATORY (INHALATION) EVERY 5 MIN PRN
Status: DISCONTINUED | OUTPATIENT
Start: 2019-04-02 | End: 2019-04-03 | Stop reason: HOSPADM

## 2019-04-02 RX ORDER — AMINOPHYLLINE 25 MG/ML
50-100 INJECTION, SOLUTION INTRAVENOUS
Status: DISCONTINUED | OUTPATIENT
Start: 2019-04-02 | End: 2019-04-03 | Stop reason: HOSPADM

## 2019-04-02 RX ORDER — REGADENOSON 0.08 MG/ML
0.4 INJECTION, SOLUTION INTRAVENOUS ONCE
Status: COMPLETED | OUTPATIENT
Start: 2019-04-02 | End: 2019-04-02

## 2019-04-02 RX ADMIN — TETROFOSMIN 9.8 MCI.: 1.38 INJECTION, POWDER, LYOPHILIZED, FOR SOLUTION INTRAVENOUS at 14:49

## 2019-04-02 RX ADMIN — TETROFOSMIN 3.54 MCI.: 1.38 INJECTION, POWDER, LYOPHILIZED, FOR SOLUTION INTRAVENOUS at 13:25

## 2019-04-02 RX ADMIN — REGADENOSON 0.4 MG: 0.08 INJECTION, SOLUTION INTRAVENOUS at 14:47

## 2019-08-23 ENCOUNTER — APPOINTMENT (OUTPATIENT)
Dept: GENERAL RADIOLOGY | Facility: CLINIC | Age: 74
End: 2019-08-23
Attending: EMERGENCY MEDICINE
Payer: MEDICARE

## 2019-08-23 ENCOUNTER — HOSPITAL ENCOUNTER (EMERGENCY)
Facility: CLINIC | Age: 74
Discharge: HOME | End: 2019-08-23
Attending: EMERGENCY MEDICINE | Admitting: EMERGENCY MEDICINE
Payer: MEDICARE

## 2019-08-23 VITALS
RESPIRATION RATE: 13 BRPM | WEIGHT: 162 LBS | OXYGEN SATURATION: 97 % | HEIGHT: 60 IN | HEART RATE: 61 BPM | BODY MASS INDEX: 31.8 KG/M2 | TEMPERATURE: 98.5 F | SYSTOLIC BLOOD PRESSURE: 159 MMHG | DIASTOLIC BLOOD PRESSURE: 84 MMHG

## 2019-08-23 DIAGNOSIS — K59.00 CONSTIPATION, UNSPECIFIED CONSTIPATION TYPE: ICD-10-CM

## 2019-08-23 LAB
ALBUMIN SERPL-MCNC: 3.3 G/DL (ref 3.4–5)
ALBUMIN UR-MCNC: NEGATIVE MG/DL
ALP SERPL-CCNC: 84 U/L (ref 40–150)
ALT SERPL W P-5'-P-CCNC: 23 U/L (ref 0–50)
ANION GAP SERPL CALCULATED.3IONS-SCNC: 6 MMOL/L (ref 3–14)
APPEARANCE UR: CLEAR
AST SERPL W P-5'-P-CCNC: 15 U/L (ref 0–45)
BASOPHILS # BLD AUTO: 0 10E9/L (ref 0–0.2)
BASOPHILS NFR BLD AUTO: 0.4 %
BILIRUB SERPL-MCNC: 0.3 MG/DL (ref 0.2–1.3)
BILIRUB UR QL STRIP: NEGATIVE
BUN SERPL-MCNC: 17 MG/DL (ref 7–30)
CALCIUM SERPL-MCNC: 8.8 MG/DL (ref 8.5–10.1)
CHLORIDE SERPL-SCNC: 106 MMOL/L (ref 94–109)
CO2 BLDCOV-SCNC: 29 MMOL/L (ref 21–28)
CO2 SERPL-SCNC: 29 MMOL/L (ref 20–32)
COLOR UR AUTO: ABNORMAL
CREAT SERPL-MCNC: 0.67 MG/DL (ref 0.52–1.04)
DIFFERENTIAL METHOD BLD: NORMAL
EOSINOPHIL # BLD AUTO: 0.2 10E9/L (ref 0–0.7)
EOSINOPHIL NFR BLD AUTO: 2.9 %
ERYTHROCYTE [DISTWIDTH] IN BLOOD BY AUTOMATED COUNT: 13.2 % (ref 10–15)
GFR SERPL CREATININE-BSD FRML MDRD: 86 ML/MIN/{1.73_M2}
GLUCOSE SERPL-MCNC: 96 MG/DL (ref 70–99)
GLUCOSE UR STRIP-MCNC: NEGATIVE MG/DL
HCT VFR BLD AUTO: 36.3 % (ref 35–47)
HGB BLD-MCNC: 11.9 G/DL (ref 11.7–15.7)
HGB UR QL STRIP: NEGATIVE
IMM GRANULOCYTES # BLD: 0 10E9/L (ref 0–0.4)
IMM GRANULOCYTES NFR BLD: 0.2 %
KETONES UR STRIP-MCNC: NEGATIVE MG/DL
LACTATE BLD-SCNC: 0.5 MMOL/L (ref 0.7–2.1)
LEUKOCYTE ESTERASE UR QL STRIP: NEGATIVE
LIPASE SERPL-CCNC: 94 U/L (ref 73–393)
LYMPHOCYTES # BLD AUTO: 1.5 10E9/L (ref 0.8–5.3)
LYMPHOCYTES NFR BLD AUTO: 26.3 %
MCH RBC QN AUTO: 30.1 PG (ref 26.5–33)
MCHC RBC AUTO-ENTMCNC: 32.8 G/DL (ref 31.5–36.5)
MCV RBC AUTO: 92 FL (ref 78–100)
MONOCYTES # BLD AUTO: 0.4 10E9/L (ref 0–1.3)
MONOCYTES NFR BLD AUTO: 6.9 %
MUCOUS THREADS #/AREA URNS LPF: PRESENT /LPF
NEUTROPHILS # BLD AUTO: 3.5 10E9/L (ref 1.6–8.3)
NEUTROPHILS NFR BLD AUTO: 63.3 %
NITRATE UR QL: NEGATIVE
NRBC # BLD AUTO: 0 10*3/UL
NRBC BLD AUTO-RTO: 0 /100
PCO2 BLDV: 53 MM HG (ref 40–50)
PH BLDV: 7.34 PH (ref 7.32–7.43)
PH UR STRIP: 7 PH (ref 5–7)
PLATELET # BLD AUTO: 237 10E9/L (ref 150–450)
PO2 BLDV: 24 MM HG (ref 25–47)
POTASSIUM SERPL-SCNC: 3.8 MMOL/L (ref 3.4–5.3)
PROT SERPL-MCNC: 7.2 G/DL (ref 6.8–8.8)
RBC # BLD AUTO: 3.96 10E12/L (ref 3.8–5.2)
RBC #/AREA URNS AUTO: 0 /HPF (ref 0–2)
SAO2 % BLDV FROM PO2: 38 %
SODIUM SERPL-SCNC: 141 MMOL/L (ref 133–144)
SOURCE: ABNORMAL
SP GR UR STRIP: 1 (ref 1–1.03)
TROPONIN I SERPL-MCNC: <0.015 UG/L (ref 0–0.04)
TSH SERPL DL<=0.005 MIU/L-ACNC: 2.87 MU/L (ref 0.4–4)
UROBILINOGEN UR STRIP-MCNC: NORMAL MG/DL (ref 0–2)
WBC # BLD AUTO: 5.5 10E9/L (ref 4–11)
WBC #/AREA URNS AUTO: <1 /HPF (ref 0–5)

## 2019-08-23 PROCEDURE — 25000132 ZZH RX MED GY IP 250 OP 250 PS 637: Mod: GY | Performed by: EMERGENCY MEDICINE

## 2019-08-23 PROCEDURE — 99285 EMERGENCY DEPT VISIT HI MDM: CPT | Mod: 25

## 2019-08-23 PROCEDURE — 83690 ASSAY OF LIPASE: CPT | Performed by: EMERGENCY MEDICINE

## 2019-08-23 PROCEDURE — 83605 ASSAY OF LACTIC ACID: CPT

## 2019-08-23 PROCEDURE — 81001 URINALYSIS AUTO W/SCOPE: CPT | Performed by: EMERGENCY MEDICINE

## 2019-08-23 PROCEDURE — 84484 ASSAY OF TROPONIN QUANT: CPT | Performed by: EMERGENCY MEDICINE

## 2019-08-23 PROCEDURE — 84443 ASSAY THYROID STIM HORMONE: CPT | Performed by: EMERGENCY MEDICINE

## 2019-08-23 PROCEDURE — 25000128 H RX IP 250 OP 636: Performed by: EMERGENCY MEDICINE

## 2019-08-23 PROCEDURE — 96360 HYDRATION IV INFUSION INIT: CPT

## 2019-08-23 PROCEDURE — 93005 ELECTROCARDIOGRAM TRACING: CPT

## 2019-08-23 PROCEDURE — 85025 COMPLETE CBC W/AUTO DIFF WBC: CPT | Performed by: EMERGENCY MEDICINE

## 2019-08-23 PROCEDURE — 96361 HYDRATE IV INFUSION ADD-ON: CPT

## 2019-08-23 PROCEDURE — 74019 RADEX ABDOMEN 2 VIEWS: CPT

## 2019-08-23 PROCEDURE — 80053 COMPREHEN METABOLIC PANEL: CPT | Performed by: EMERGENCY MEDICINE

## 2019-08-23 PROCEDURE — 82803 BLOOD GASES ANY COMBINATION: CPT

## 2019-08-23 RX ADMIN — MAGNESIUM CITRATE 286 ML: 1.75 LIQUID ORAL at 20:00

## 2019-08-23 RX ADMIN — SODIUM CHLORIDE 1000 ML: 9 INJECTION, SOLUTION INTRAVENOUS at 17:36

## 2019-08-23 ASSESSMENT — ENCOUNTER SYMPTOMS
BLOOD IN STOOL: 0
FEVER: 0
COUGH: 0
CONSTIPATION: 1
WEAKNESS: 1
VOMITING: 0
DYSURIA: 0
ABDOMINAL PAIN: 1

## 2019-08-23 ASSESSMENT — MIFFLIN-ST. JEOR: SCORE: 1156.33

## 2019-08-23 NOTE — ED PROVIDER NOTES
"  History     Chief Complaint:  \"There was blood coming out of my mouth.\"    The history is provided by the patient and a relative. The history is limited by a language barrier. A  was used.      Dea Burnette is a 74 year old female who presents with multiple concerns.  Earlier today she was brushing her teeth and had blood come out of her mouth.  She insists this was not from her gums but denies any coughing/hemoptysis or emesis/hematemesis.  She states the amount of bleeding was small. \"I don't know where it came from.\"  She has had 3 to 4 days of epigastric abdominal discomfort.  She states it is not really a pain and does not currently have this discomfort, but when she gets this symptom it makes her generally feel weak with a blurred vision \"like a white sheet\" in front of her eyes. She has had these symptoms before and reports they improved somewhat spontaneously since arriving to the emergency department.  She denies tobacco use, alcohol use, or excessive NSAID use.  She denies black or bloody stools.  She feels constipated but did have a small bowel movement today.  She has had no fever, dysuria, or other concerns.    Allergies:  No Known Allergies     Medications:    Tylenol  Caltrate  Pravastatin  Dea's daughter adds that the patient takes numerous over the counter herbs and supplements.    Past Medical History:    Past Medical History:   Diagnosis Date     Back pain      Cholelithiasis      Diverticulosis      Hyperlipidemia      Noninfectious ileitis      Obesity      Osteoarthritis      Osteopenia      Screen for colon cancer      Thyroid function study abnormality      Vitamin D deficiency        Past Surgical History:    Past Surgical History:   Procedure Laterality Date     GYN SURGERY           PHACOEMULSIFICATION CLEAR CORNEA WITH STANDARD INTRAOCULAR LENS IMPLANT  2012    Procedure: PHACOEMULSIFICATION CLEAR CORNEA WITH STANDARD INTRAOCULAR LENS " IMPLANT;  RIGHT PHACOEMULSIFICATION CLEAR CORNEA WITH STANDARD INTRAOCULAR LENS IMPLANT ;  Surgeon: Blair Mayorga MD;  Location: [facility]     PHACOEMULSIFICATION CLEAR CORNEA WITH STANDARD INTRAOCULAR LENS IMPLANT  1/30/2013    Procedure: PHACOEMULSIFICATION CLEAR CORNEA WITH STANDARD INTRAOCULAR LENS IMPLANT;  RIGHT PHACOEMULSIFICATION CLEAR CORNEA WITH STANDARD INTRAOCULAR LENS IMPLANT ;  Surgeon: Blair Mayorga MD;  Location: [facility]       Family History:    History reviewed. No pertinent family history.    Social History:  Marital Status:     Presents with daughter.  Never smoker.    Denies alcohol use.    Review of Systems   Constitutional: Negative for fever.   Eyes: Positive for visual disturbance (blurred, white).   Respiratory: Negative for cough.    Gastrointestinal: Positive for abdominal pain (intermittent discomfort, currently resolved) and constipation. Negative for blood in stool and vomiting.   Genitourinary: Negative for dysuria.   Neurological: Positive for weakness (generally).   All other systems reviewed and are negative.    Physical Exam     Patient Vitals for the past 24 hrs:   BP Temp Temp src Pulse Heart Rate Resp SpO2 Height Weight   08/23/19 2110 (!) 159/84 -- -- -- 64 -- -- -- --   08/23/19 1900 (!) 164/70 -- -- 61 -- -- 97 % -- --   08/23/19 1845 (!) 164/72 -- -- 57 -- -- 98 % -- --   08/23/19 1830 (!) 148/74 -- -- -- -- -- -- -- --   08/23/19 1800 (!) 169/60 -- -- 63 62 13 96 % -- --   08/23/19 1745 (!) 171/77 -- -- 60 58 11 98 % -- --   08/23/19 1626 (!) 187/63 98.5  F (36.9  C) Oral 65 -- 16 97 % 1.524 m (5') 73.5 kg (162 lb)     Physical Exam  General: Well-developed and well-nourished. Well appearing elderly  woman. Cooperative.  Head:  Atraumatic.  Eyes:  Conjunctivae, lids, and sclerae are normal.  ENT:    Normal nose. Moist mucous membranes.  No active bleeding intraorally with no evidence of recent bleed.  Normal posterior oropharynx.  Neck:  Supple. Normal range  of motion.  CV:  Regular rate and rhythm. Normal heart sounds with no murmurs, rubs, or gallops detected.  Resp:  No respiratory distress. Clear to auscultation bilaterally without decreased breath sounds, wheezing, rales, or rhonchi.  GI:  Soft. Non-distended. Non-tender.    MS:  Normal ROM. No bilateral lower extremity edema.  Skin:  Warm. Non-diaphoretic. No pallor.  Neuro: Awake. A&Ox3. Normal strength.  Psych:  Normal mood and affect. Normal speech.  Vitals reviewed.    Emergency Department Course   EKG  Indication: weakness  Time: 17:38  Rate 60 bpm. DC interval 164. QRS duration 84. QT/QTc 444/444.   Normal sinus rhythm.  Normal ECG.   No acute ST changes.  No change as compared to prior, dated 1/29/19.    Imaging:  Radiographic findings were communicated with the patient who voiced understanding of the findings.    Abdomen XR, 2 vw, flat and upright  IMPRESSION: Moderate to large stool burden seen in the colon in  keeping with constipation. Nonobstructive bowel gas pattern. No free  air seen underneath the diaphragm. Multilevel degenerative changes  seen in the spine and bilateral hip joints.    JENNIFER MCRAE MD  As read by Radiology.    Laboratory:  UA: Mucous present, o/w Neg.    ISTAT: PCO2 53 (H), PO2 24 (L), Bicarbonate 29 (H), Lactic acid 0.5 (L), o/w WNL    CMP: Albumin 3.3 (L), o/w WNL (Creatinine 0.67)  CBC: WNL (WBC 5.5, HGB 11.9, )    Lipase 94  1713 Troponin <0.015  TSH 2.87    Interventions:  1736 NS 1L IV Bolus  2000 Pink Lady Enema 286 mL Rectal    Emergency Department Course:  Past medical records, nursing notes, and vitals reviewed.  1720: I performed an exam of the patient and obtained history, as documented above.    1925: Reevaluated patient.  Her vision is improved and feels overall improved.  We discussed enema and she would like to proceed.    2042: I rechecked the patient. Explained findings to patient. She had small bowel movement with enema, and is comfortable with  discharge.    2051: I rechecked the patient. Findings and plan explained to the patient. Patient discharged home with instructions regarding supportive care, medications, and reasons to return. The importance of close follow-up was reviewed.     Impression & Plan    Medical Decision Making:  Dea is a 74 year old female who presents with multiple complaints. She said she was brushing her teeth and had blood come out, though she does not believe this is from her gums. However, she denies hemoptysis and hematemesis. She also complains of 3-4 days of abdominal discomfort that is not currently present, but this has made her feel weak and also some vision changes. She has had these similar symptoms in the past and denies other concerns. She appears quite well on examination and has no abdominal tenderness. She does feel she might be constipated, so I performed an x-ray of the abdomen. This x-ray reveals a moderate to large stool burden consistent with constipation without evidence of obstruction or free air.  Fortunately, the remainder of her work-up for her other vague symptoms is unremarkable including EKG without acute ST changes or arrhythmias and an undetectable troponin.  There is no evidence of urinary tract infection and she has no leukocytosis or lactic acidosis.  Thyroid studies are normal as are her electrolytes with a normal creatinine. She was given IV fluids during her work-up and with these interventions alone felt her weakness and vision changes resolved. Without focal neurologic deficit, I doubt CVA or other acute intracranial pathology and do not feel further workup is indicated.  I offered and she accepted an enema for her constipation.  She was unable to hold this for very long and had only a small bowel movement.  I am hopeful that this will help initiate further bowel movements and we discussed treatment for constipation going forward including increasing her water, fiber, and activity and using  medications such as senna or docusate in the short-term.  I also recommended she use MiraLAX and Colace once this episode of constipation has resolved to prevent recurrence.  She agrees to follow-up with her primary care provider next week to ensure she is improving as expected though she understands low threshold for return should she have severe abdominal pain, neurologic symptoms, fever, or any other concerns.  Exact etiology for the blood in her mouth is unclear though I have a high suspicion this is related simply to brushing her teeth as her hemoglobin is normal and she denies hematemesis or hemoptysis.  She will return should she have a large volume of either of these.  She does remark she has a little bit of bitterness in her mouth upon discharge.  This combined with some intermittent epigastric pain may be related to GERD and I have recommended she use over-the-counter treatments like Pepcid or Prilosec.  However, again, there is no evidence that she had hematemesis and further emergent work-up for GI bleeding is not indicated.  I updated the patient and her daughter on all her results and they verbalized understanding and are amenable to the aforementioned plan.  All questions answered.    Of note, patient is primarily [place]-speaking and all interactions were completed using a [place] .    Diagnosis:    ICD-10-CM   1. Constipation, unspecified constipation type K59.00     Disposition:  Discharged home.    Maureen Zepeda MD  8/23/2019    EMERGENCY DEPARTMENT    Scribe Disclosure:  I, John Vieira, am serving as a scribe at 9:08 PM on 8/23/2019 to document services personally performed by Maureen Zepeda MD based on my observations and the provider's statements to me.    [facility] EMERGENCY DEPARTMENT       Maureen Zepeda MD  08/26/19 0752

## 2019-08-23 NOTE — ED AVS SNAPSHOT
Emergency Department  [address] [facility] [address]  Phone:  [phone]  Fax:  [phone]                                    Dea Burnette   MRN: [id number]    Department:   Emergency Department   Date of Visit:  8/23/2019           After Visit Summary Signature Page    I have received my discharge instructions, and my questions have been answered. I have discussed any challenges I see with this plan with the nurse or doctor.    ..........................................................................................................................................  Patient/Patient Representative Signature      ..........................................................................................................................................  Patient Representative Print Name and Relationship to Patient    ..................................................               ................................................  Date                                   Time    ..........................................................................................................................................  Reviewed by Signature/Title    ...................................................              ..............................................  Date                                               Time          22EPIC Rev 08/18

## 2019-08-23 NOTE — ED TRIAGE NOTES
Pt reports increased weakness, blurry vision, and spitting up blood. Pt reports this starting last night.

## 2019-08-24 LAB — INTERPRETATION ECG - MUSE: NORMAL

## 2019-08-24 NOTE — ED NOTES
Pt only able to hold enema for 3 minutes approx. Small results. Pt does not feel like she needs to go anymore.  Dr. Zepeda updated.

## 2019-08-24 NOTE — DISCHARGE INSTRUCTIONS
Increase water, fiber, and activity to help with constipation.  You can also use medications help to go like senna or docusate.  Once constipation has resolved, stop those medications but use daily medications like MiraLAX and Colace to keep bowel movements regular.  Follow up with your primary care provider next week to ensure you are improving as expected.  Return immediately with severe abdominal pain, fever greater 101  F, weakness, numbness or tingling, large volume of blood from your mouth, or any other new or concerning symptoms.  Consider purchasing over-the-counter antacids like Pepcid or Prilosec for better taste in your mouth and upper abdominal pain.

## 2019-10-28 ENCOUNTER — HOSPITAL ENCOUNTER (EMERGENCY)
Facility: CLINIC | Age: 74
Discharge: HOME | End: 2019-10-28
Attending: EMERGENCY MEDICINE | Admitting: EMERGENCY MEDICINE
Payer: MEDICARE

## 2019-10-28 ENCOUNTER — APPOINTMENT (OUTPATIENT)
Dept: GENERAL RADIOLOGY | Facility: CLINIC | Age: 74
End: 2019-10-28
Attending: EMERGENCY MEDICINE
Payer: MEDICARE

## 2019-10-28 ENCOUNTER — APPOINTMENT (OUTPATIENT)
Dept: CT IMAGING | Facility: CLINIC | Age: 74
End: 2019-10-28
Attending: EMERGENCY MEDICINE
Payer: MEDICARE

## 2019-10-28 VITALS
HEART RATE: 64 BPM | RESPIRATION RATE: 13 BRPM | BODY MASS INDEX: 32.39 KG/M2 | DIASTOLIC BLOOD PRESSURE: 81 MMHG | OXYGEN SATURATION: 94 % | WEIGHT: 165 LBS | TEMPERATURE: 98 F | HEIGHT: 60 IN | SYSTOLIC BLOOD PRESSURE: 163 MMHG

## 2019-10-28 DIAGNOSIS — K80.20 CALCULUS OF GALLBLADDER WITHOUT CHOLECYSTITIS WITHOUT OBSTRUCTION: ICD-10-CM

## 2019-10-28 DIAGNOSIS — I10 HYPERTENSION, UNSPECIFIED TYPE: ICD-10-CM

## 2019-10-28 DIAGNOSIS — R91.8 PULMONARY NODULES: ICD-10-CM

## 2019-10-28 DIAGNOSIS — M62.838 SPASM OF MUSCLE: ICD-10-CM

## 2019-10-28 DIAGNOSIS — K44.9 HIATAL HERNIA: ICD-10-CM

## 2019-10-28 LAB
ALBUMIN SERPL-MCNC: 3.2 G/DL (ref 3.4–5)
ALP SERPL-CCNC: 88 U/L (ref 40–150)
ALT SERPL W P-5'-P-CCNC: 21 U/L (ref 0–50)
ANION GAP SERPL CALCULATED.3IONS-SCNC: 4 MMOL/L (ref 3–14)
AST SERPL W P-5'-P-CCNC: 20 U/L (ref 0–45)
BASOPHILS # BLD AUTO: 0 10E9/L (ref 0–0.2)
BASOPHILS NFR BLD AUTO: 0.4 %
BILIRUB DIRECT SERPL-MCNC: <0.1 MG/DL (ref 0–0.2)
BILIRUB SERPL-MCNC: 0.2 MG/DL (ref 0.2–1.3)
BUN SERPL-MCNC: 20 MG/DL (ref 7–30)
CALCIUM SERPL-MCNC: 8.8 MG/DL (ref 8.5–10.1)
CHLORIDE SERPL-SCNC: 107 MMOL/L (ref 94–109)
CO2 SERPL-SCNC: 28 MMOL/L (ref 20–32)
CREAT SERPL-MCNC: 0.83 MG/DL (ref 0.52–1.04)
DEPRECATED S PYO AG THROAT QL EIA: NORMAL
DIFFERENTIAL METHOD BLD: NORMAL
EOSINOPHIL # BLD AUTO: 0.2 10E9/L (ref 0–0.7)
EOSINOPHIL NFR BLD AUTO: 1.8 %
ERYTHROCYTE [DISTWIDTH] IN BLOOD BY AUTOMATED COUNT: 13.4 % (ref 10–15)
FLUAV+FLUBV AG SPEC QL: NEGATIVE
FLUAV+FLUBV AG SPEC QL: NEGATIVE
GFR SERPL CREATININE-BSD FRML MDRD: 69 ML/MIN/{1.73_M2}
GLUCOSE SERPL-MCNC: 109 MG/DL (ref 70–99)
HCT VFR BLD AUTO: 40.2 % (ref 35–47)
HGB BLD-MCNC: 13 G/DL (ref 11.7–15.7)
IMM GRANULOCYTES # BLD: 0 10E9/L (ref 0–0.4)
IMM GRANULOCYTES NFR BLD: 0.2 %
INTERPRETATION ECG - MUSE: NORMAL
LIPASE SERPL-CCNC: 92 U/L (ref 73–393)
LYMPHOCYTES # BLD AUTO: 1.2 10E9/L (ref 0.8–5.3)
LYMPHOCYTES NFR BLD AUTO: 14.4 %
MCH RBC QN AUTO: 30.1 PG (ref 26.5–33)
MCHC RBC AUTO-ENTMCNC: 32.3 G/DL (ref 31.5–36.5)
MCV RBC AUTO: 93 FL (ref 78–100)
MONOCYTES # BLD AUTO: 0.4 10E9/L (ref 0–1.3)
MONOCYTES NFR BLD AUTO: 5.3 %
NEUTROPHILS # BLD AUTO: 6.3 10E9/L (ref 1.6–8.3)
NEUTROPHILS NFR BLD AUTO: 77.9 %
NRBC # BLD AUTO: 0 10*3/UL
NRBC BLD AUTO-RTO: 0 /100
PLATELET # BLD AUTO: 241 10E9/L (ref 150–450)
POTASSIUM SERPL-SCNC: 3.8 MMOL/L (ref 3.4–5.3)
PROT SERPL-MCNC: 7.4 G/DL (ref 6.8–8.8)
RBC # BLD AUTO: 4.32 10E12/L (ref 3.8–5.2)
SODIUM SERPL-SCNC: 139 MMOL/L (ref 133–144)
SPECIMEN SOURCE: NORMAL
SPECIMEN SOURCE: NORMAL
TROPONIN I BLD-MCNC: 0.02 UG/L (ref 0–0.08)
TROPONIN I SERPL-MCNC: <0.015 UG/L (ref 0–0.04)
WBC # BLD AUTO: 8.1 10E9/L (ref 4–11)

## 2019-10-28 PROCEDURE — 87880 STREP A ASSAY W/OPTIC: CPT | Performed by: EMERGENCY MEDICINE

## 2019-10-28 PROCEDURE — 84484 ASSAY OF TROPONIN QUANT: CPT | Performed by: EMERGENCY MEDICINE

## 2019-10-28 PROCEDURE — 84484 ASSAY OF TROPONIN QUANT: CPT

## 2019-10-28 PROCEDURE — 85025 COMPLETE CBC W/AUTO DIFF WBC: CPT | Performed by: EMERGENCY MEDICINE

## 2019-10-28 PROCEDURE — 99285 EMERGENCY DEPT VISIT HI MDM: CPT | Mod: 25

## 2019-10-28 PROCEDURE — 25000128 H RX IP 250 OP 636: Performed by: EMERGENCY MEDICINE

## 2019-10-28 PROCEDURE — 71046 X-RAY EXAM CHEST 2 VIEWS: CPT

## 2019-10-28 PROCEDURE — 80048 BASIC METABOLIC PNL TOTAL CA: CPT | Performed by: EMERGENCY MEDICINE

## 2019-10-28 PROCEDURE — 25000132 ZZH RX MED GY IP 250 OP 250 PS 637: Mod: GY | Performed by: EMERGENCY MEDICINE

## 2019-10-28 PROCEDURE — 83690 ASSAY OF LIPASE: CPT | Performed by: EMERGENCY MEDICINE

## 2019-10-28 PROCEDURE — 93005 ELECTROCARDIOGRAM TRACING: CPT

## 2019-10-28 PROCEDURE — 71275 CT ANGIOGRAPHY CHEST: CPT

## 2019-10-28 PROCEDURE — 87081 CULTURE SCREEN ONLY: CPT | Performed by: EMERGENCY MEDICINE

## 2019-10-28 PROCEDURE — 87804 INFLUENZA ASSAY W/OPTIC: CPT | Mod: 59 | Performed by: EMERGENCY MEDICINE

## 2019-10-28 PROCEDURE — 80076 HEPATIC FUNCTION PANEL: CPT | Performed by: EMERGENCY MEDICINE

## 2019-10-28 PROCEDURE — 25000125 ZZHC RX 250: Performed by: EMERGENCY MEDICINE

## 2019-10-28 RX ORDER — ACETAMINOPHEN 325 MG/1
975 TABLET ORAL ONCE
Status: COMPLETED | OUTPATIENT
Start: 2019-10-28 | End: 2019-10-28

## 2019-10-28 RX ORDER — CYCLOBENZAPRINE HCL 5 MG
5 TABLET ORAL 3 TIMES DAILY PRN
Qty: 15 TABLET | Refills: 0 | Status: SHIPPED | OUTPATIENT
Start: 2019-10-28 | End: 2019-11-02

## 2019-10-28 RX ORDER — IBUPROFEN 600 MG/1
600 TABLET, FILM COATED ORAL ONCE
Status: COMPLETED | OUTPATIENT
Start: 2019-10-28 | End: 2019-10-28

## 2019-10-28 RX ORDER — IOPAMIDOL 755 MG/ML
70 INJECTION, SOLUTION INTRAVASCULAR ONCE
Status: COMPLETED | OUTPATIENT
Start: 2019-10-28 | End: 2019-10-28

## 2019-10-28 RX ORDER — FAMOTIDINE 20 MG/1
20 TABLET, FILM COATED ORAL 2 TIMES DAILY
Qty: 20 TABLET | Refills: 0 | Status: SHIPPED | OUTPATIENT
Start: 2019-10-28 | End: 2019-11-07

## 2019-10-28 RX ADMIN — ACETAMINOPHEN 975 MG: 325 TABLET, FILM COATED ORAL at 04:30

## 2019-10-28 RX ADMIN — IBUPROFEN 600 MG: 600 TABLET ORAL at 04:30

## 2019-10-28 RX ADMIN — IOPAMIDOL 70 ML: 755 INJECTION, SOLUTION INTRAVENOUS at 05:36

## 2019-10-28 RX ADMIN — SODIUM CHLORIDE 100 ML: 9 INJECTION, SOLUTION INTRAVENOUS at 05:36

## 2019-10-28 ASSESSMENT — ENCOUNTER SYMPTOMS
DIFFICULTY URINATING: 0
WEAKNESS: 1
COUGH: 1
HEADACHES: 1
SORE THROAT: 1
ABDOMINAL PAIN: 0
FEVER: 0
MYALGIAS: 0
NUMBNESS: 1

## 2019-10-28 ASSESSMENT — MIFFLIN-ST. JEOR: SCORE: 1169.94

## 2019-10-28 NOTE — ED PROVIDER NOTES
History     Chief Complaint:  Arm weakness    The history is provided by the patient. The history is limited by a language barrier (interpreted by patient's granddaughter).      Dea Burnette is a 74 year old female, with a history of HLD, who presents with her granddaughter to the emergency department for evaluation of bilateral arm weakness. The patient reports she has been experiencing a sore throat, dry mouth, headache and a slight cough for the past three days prior to evaluation and then around 0100 this morning she started experiencing bilateral arm weakness and numbness. She denies any fever, myalgias, difficulty urinating or abdominal pain.    Allergies:  No known drug allergies     Medications:    Protonix  Lipitor  Pravastatin    Past Medical History:    Obesity  Chronic neck pain  LTBI  Osteopenia  Cholelithiasis  Diverticulitis  Osteopenia  HLD    Past Surgical History:     section x2  Appendectomy  Phacoemulsification clear cornea with IOL x2    Family History:    Cancer    Social History:  Smoking status: Never  Alcohol use: No  Drug use: No  The patient presents to the emergency department with her granddaughter.  PCP: Marcel Carmichael  Marital Status:   [4]     Review of Systems   Constitutional: Negative for fever.   HENT: Positive for sore throat.    Respiratory: Positive for cough.    Gastrointestinal: Negative for abdominal pain.   Genitourinary: Negative for difficulty urinating.   Musculoskeletal: Negative for myalgias.   Neurological: Positive for weakness, numbness and headaches.   All other systems reviewed and are negative.        Physical Exam     Patient Vitals for the past 24 hrs:   BP Temp Temp src Pulse Heart Rate Resp SpO2 Height Weight   10/28/19 0500 (!) 188/90 -- -- 68 67 16 97 % -- --   10/28/19 0430 (!) 205/89 -- -- -- 62 23 100 % -- --   10/28/19 0400 (!) 181/81 -- -- 68 70 (!) 32 94 % -- --   10/28/19 0310 (!) 176/65 98  F (36.7  C) Oral 68 68 16 98 %  1.524 m (5') 74.8 kg (165 lb)     Physical Exam  General: Alert, interactive in mild distress  Head:  Scalp is atraumatic  Eyes:  The pupils are equal, round, and reactive to light    EOM's intact    No scleral icterus  ENT:      Nose:  The external nose is normal  Ears:  External ears are normal  Mouth/Throat: The oropharynx is mildly erythematous.    Mucus membranes are moist       Neck:  Normal range of motion.      There is no rigidity.    Trachea is in the midline         CV:  Regular rate and rhythm    No murmur   Resp:  Breath sounds are clear bilaterally    Non-labored, no retractions or accessory muscle use      GI:  Abdomen is soft, no distension, no tenderness.       MS:  Normal strength and 2+ pulses in all 4 extremities, No midline cervical, thoracic, or lumbar tenderness  Skin:  Warm and dry, No rash or lesions noted.  Neuro: Strength 5/5 x4.  Sensation intact  In all 4 extremities.      Cranial nerves 2-12 grossly intact.    GCS: 15  Psych:  Awake. Alert.  Normal affect.      Appropriate interactions.  Emergency Department Course   ECG (3:12:21):  Rate 70 bpm. CT interval 162. QRS duration 82. QT/QTc 406/438. P-R-T axes 28 13 46. Normal sinus rhythm. Minimal voltage criteria for LVH, may be normal variant. Borderline ECG. No significant change when compared to EKG dated 8/23/19.  Interpreted at 0324 by TriggerGlenroy MD.    Imaging:  Radiographic findings were communicated with the patient and family who voiced understanding of the findings.    XR Chest 2 Views  IMPRESSION: No acute abnormality.  As read by Radiology.    CT Chest Pulmonary Embolism w Contrast                                                                IMPRESSION:  1. There is no pulmonary embolus, aortic aneurysm or dissection. No  acute chest abnormality.  2. Stable small indeterminate right lung nodule.  3. Small hiatal hernia.  4. Cholelithiasis.  5. Diffuse fatty infiltration of the liver.   As read by  Radiology.    Laboratory:  CBC: AWNL (WBC 8.1, HGB 13.0, )  BMP: Glucose 109 (H) o/w WNL (Creatinine 0.83.)  Troponin POCT (0349): 0.02  Hepatic panel: Albumin 3.2 (L) o/w WNL  Lipase: 92  Repeat Troponin 0618:  <0.015    Rapid strep screen: Negative  Beta strep group A culture: Pending  Influenza A/B antigen: Negative    Interventions:  0430 Tylenol 975 mg PO  0430 Ibuprofen 600 mg PO    Emergency Department Course:  Past medical records, nursing notes, and vitals reviewed.  0323: I performed an exam of the patient and obtained history, as documented above.    EKG was obtained and reviewed in the emergency department.     IV inserted and blood drawn.    I obtained a strep and influenza test.    The patient was sent for a chest x-ray while in the emergency department, findings above.    0458: I rechecked the patient. Explained findings to the patient and her daughter.    Patient sent for chest CT in the emergency department, findings above.    0630: I rechecked the patient. Findings and plan explained to the Patient and other:granddaughter. Patient discharged home with instructions regarding supportive care, medications, and reasons to return. The importance of close follow-up was reviewed.   Impression & Plan    Medical Decision Making:  Following presentation history and physical examination were performed, formal  was declined.  The above work-up was undertaken with a broad differential including aortic dissection, pulmonary embolism, cerebrovascular infarction, acute coronary syndrome, musculoskeletal etiology all considered.  Given the negative CT scan and lab work-up I doubt PE, aortic dissection, or acute coronary syndrome.  She has no focal neurologic deficit to suggest stroke.  Her pain seemed to come in waves and I find it somewhat odd that biliary colic would cause arm discomfort, she was informed of the cholelithiasis but I think this is likely an incidental finding.  Same is true for  her hiatal hernia.  She was informed of the high blood pressure and this did trend down during her stay in the emergency department I recommend she follow-up with her primary care provider for this.  In the meantime I will place her on Flexeril for the intermittent muscle spasms of the upper extremities as well as Pepcid for the hiatal hernia.  She was in agreement with the plan of action and was subsequently discharged home.  She was advised of the pulmonary nodule and advised to follow-up with her primary care provider.    Diagnosis:    ICD-10-CM    1. Spasm of muscle M62.838    2. Calculus of gallbladder without cholecystitis without obstruction K80.20    3. Pulmonary nodules R91.8    4. Hiatal hernia K44.9    5. Hypertension, unspecified type I10        Disposition:  Discharged to home with instructions for follow up.  Discharge Medications:  New Prescriptions    CYCLOBENZAPRINE (FLEXERIL) 5 MG TABLET    Take 1 tablet (5 mg) by mouth 3 times daily as needed for muscle spasms    FAMOTIDINE (PEPCID) 20 MG TABLET    Take 1 tablet (20 mg) by mouth 2 times daily for 10 days     Amanda Sanchez  10/28/2019    EMERGENCY DEPARTMENT  Scribe Disclosure:  Amanda TAN, am serving as a scribe at 3:23 AM on 10/28/2019 to document services personally performed by Glenroy Heck MD based on my observations and the provider's statements to me.      Glenroy Heck MD  10/28/19 0649

## 2019-10-28 NOTE — ED AVS SNAPSHOT
Emergency Department  [address] [facility] [address]  Phone:  [phone]  Fax:  [phone]                                    Dea Burnette   MRN: [id number]    Department:   Emergency Department   Date of Visit:  10/28/2019           After Visit Summary Signature Page    I have received my discharge instructions, and my questions have been answered. I have discussed any challenges I see with this plan with the nurse or doctor.    ..........................................................................................................................................  Patient/Patient Representative Signature      ..........................................................................................................................................  Patient Representative Print Name and Relationship to Patient    ..................................................               ................................................  Date                                   Time    ..........................................................................................................................................  Reviewed by Signature/Title    ...................................................              ..............................................  Date                                               Time          22EPIC Rev 08/18

## 2019-10-28 NOTE — ED TRIAGE NOTES
Patient with cough/cold symptoms x 3 days. Tonight she woke with pain and heaviness in both arms.

## 2019-10-28 NOTE — RESULT ENCOUNTER NOTE
Preliminary Beta strep group A r/o culture is PENDING and/or NEGATIVE at this time.   No changes in treatment per [facility] Strep protocol.

## 2019-10-30 LAB
BACTERIA SPEC CULT: NORMAL
Lab: NORMAL
SPECIMEN SOURCE: NORMAL

## 2020-10-27 ENCOUNTER — HOSPITAL ENCOUNTER (EMERGENCY)
Facility: CLINIC | Age: 75
Discharge: HOME | End: 2020-10-27
Attending: NURSE PRACTITIONER | Admitting: NURSE PRACTITIONER
Payer: COMMERCIAL

## 2020-10-27 ENCOUNTER — ALLIED HEALTH/NURSE VISIT (OUTPATIENT)
Dept: INTERPRETER SERVICES | Facility: CLINIC | Age: 75
End: 2020-10-27
Payer: COMMERCIAL

## 2020-10-27 ENCOUNTER — APPOINTMENT (OUTPATIENT)
Dept: GENERAL RADIOLOGY | Facility: CLINIC | Age: 75
End: 2020-10-27
Attending: NURSE PRACTITIONER
Payer: COMMERCIAL

## 2020-10-27 VITALS
HEART RATE: 71 BPM | RESPIRATION RATE: 22 BRPM | WEIGHT: 157 LBS | SYSTOLIC BLOOD PRESSURE: 156 MMHG | BODY MASS INDEX: 30.82 KG/M2 | OXYGEN SATURATION: 97 % | TEMPERATURE: 97.4 F | DIASTOLIC BLOOD PRESSURE: 113 MMHG | HEIGHT: 60 IN

## 2020-10-27 DIAGNOSIS — R09.81 NASAL CONGESTION: ICD-10-CM

## 2020-10-27 DIAGNOSIS — R07.89 CHEST TIGHTNESS: ICD-10-CM

## 2020-10-27 DIAGNOSIS — I10 HIGH BLOOD PRESSURE: ICD-10-CM

## 2020-10-27 DIAGNOSIS — Z20.822 ENCOUNTER FOR LABORATORY TESTING FOR COVID-19 VIRUS: ICD-10-CM

## 2020-10-27 PROBLEM — Z98.890 S/P DILATATION OF ESOPHAGEAL STRICTURE: Status: ACTIVE | Noted: 2019-02-15

## 2020-10-27 PROBLEM — Z87.19 S/P DILATATION OF ESOPHAGEAL STRICTURE: Status: ACTIVE | Noted: 2019-02-15

## 2020-10-27 PROBLEM — Z22.7 LTBI (LATENT TUBERCULOSIS INFECTION): Status: ACTIVE | Noted: 2017-02-01

## 2020-10-27 PROBLEM — F40.243 FLYING PHOBIA: Status: ACTIVE | Noted: 2018-03-23

## 2020-10-27 PROBLEM — K44.9 HIATAL HERNIA: Status: ACTIVE | Noted: 2019-10-31

## 2020-10-27 PROBLEM — E66.812 OBESITY, CLASS II, BMI 35-39.9: Status: ACTIVE | Noted: 2019-03-25

## 2020-10-27 PROBLEM — M54.2 NECK PAIN, CHRONIC: Status: ACTIVE | Noted: 2017-07-12

## 2020-10-27 PROBLEM — K76.0 FATTY LIVER: Status: ACTIVE | Noted: 2019-10-31

## 2020-10-27 PROBLEM — G89.29 NECK PAIN, CHRONIC: Status: ACTIVE | Noted: 2017-07-12

## 2020-10-27 PROBLEM — R03.0 ELEVATED BP WITHOUT DIAGNOSIS OF HYPERTENSION: Status: ACTIVE | Noted: 2018-02-26

## 2020-10-27 PROBLEM — G44.89 HEADACHE SYNDROME: Status: ACTIVE | Noted: 2017-07-12

## 2020-10-27 LAB
ANION GAP SERPL CALCULATED.3IONS-SCNC: 6 MMOL/L (ref 3–14)
BASOPHILS # BLD AUTO: 0 10E9/L (ref 0–0.2)
BASOPHILS NFR BLD AUTO: 0 %
BUN SERPL-MCNC: 14 MG/DL (ref 7–30)
CALCIUM SERPL-MCNC: 7.9 MG/DL (ref 8.5–10.1)
CHLORIDE SERPL-SCNC: 106 MMOL/L (ref 94–109)
CO2 SERPL-SCNC: 27 MMOL/L (ref 20–32)
CREAT SERPL-MCNC: 0.79 MG/DL (ref 0.52–1.04)
DIFFERENTIAL METHOD BLD: NORMAL
EOSINOPHIL # BLD AUTO: 0 10E9/L (ref 0–0.7)
EOSINOPHIL NFR BLD AUTO: 0.5 %
ERYTHROCYTE [DISTWIDTH] IN BLOOD BY AUTOMATED COUNT: 13.9 % (ref 10–15)
GFR SERPL CREATININE-BSD FRML MDRD: 73 ML/MIN/{1.73_M2}
GLUCOSE SERPL-MCNC: 91 MG/DL (ref 70–99)
HCT VFR BLD AUTO: 37.3 % (ref 35–47)
HGB BLD-MCNC: 12 G/DL (ref 11.7–15.7)
IMM GRANULOCYTES # BLD: 0 10E9/L (ref 0–0.4)
IMM GRANULOCYTES NFR BLD: 0 %
INTERPRETATION ECG - MUSE: NORMAL
LYMPHOCYTES # BLD AUTO: 0.9 10E9/L (ref 0.8–5.3)
LYMPHOCYTES NFR BLD AUTO: 21.7 %
MCH RBC QN AUTO: 30.5 PG (ref 26.5–33)
MCHC RBC AUTO-ENTMCNC: 32.2 G/DL (ref 31.5–36.5)
MCV RBC AUTO: 95 FL (ref 78–100)
MONOCYTES # BLD AUTO: 0.6 10E9/L (ref 0–1.3)
MONOCYTES NFR BLD AUTO: 13.2 %
NEUTROPHILS # BLD AUTO: 2.7 10E9/L (ref 1.6–8.3)
NEUTROPHILS NFR BLD AUTO: 64.6 %
NRBC # BLD AUTO: 0 10*3/UL
NRBC BLD AUTO-RTO: 0 /100
PLATELET # BLD AUTO: 205 10E9/L (ref 150–450)
POTASSIUM SERPL-SCNC: 3.6 MMOL/L (ref 3.4–5.3)
RBC # BLD AUTO: 3.94 10E12/L (ref 3.8–5.2)
SODIUM SERPL-SCNC: 139 MMOL/L (ref 133–144)
TROPONIN I SERPL-MCNC: <0.015 UG/L (ref 0–0.04)
WBC # BLD AUTO: 4.2 10E9/L (ref 4–11)

## 2020-10-27 PROCEDURE — 71045 X-RAY EXAM CHEST 1 VIEW: CPT

## 2020-10-27 PROCEDURE — 93005 ELECTROCARDIOGRAM TRACING: CPT

## 2020-10-27 PROCEDURE — 84484 ASSAY OF TROPONIN QUANT: CPT | Performed by: NURSE PRACTITIONER

## 2020-10-27 PROCEDURE — 99285 EMERGENCY DEPT VISIT HI MDM: CPT | Mod: 25

## 2020-10-27 PROCEDURE — 85025 COMPLETE CBC W/AUTO DIFF WBC: CPT | Performed by: NURSE PRACTITIONER

## 2020-10-27 PROCEDURE — C9803 HOPD COVID-19 SPEC COLLECT: HCPCS

## 2020-10-27 PROCEDURE — U0003 INFECTIOUS AGENT DETECTION BY NUCLEIC ACID (DNA OR RNA); SEVERE ACUTE RESPIRATORY SYNDROME CORONAVIRUS 2 (SARS-COV-2) (CORONAVIRUS DISEASE [COVID-19]), AMPLIFIED PROBE TECHNIQUE, MAKING USE OF HIGH THROUGHPUT TECHNOLOGIES AS DESCRIBED BY CMS-2020-01-R: HCPCS | Performed by: NURSE PRACTITIONER

## 2020-10-27 PROCEDURE — 80048 BASIC METABOLIC PNL TOTAL CA: CPT | Performed by: NURSE PRACTITIONER

## 2020-10-27 PROCEDURE — T1013 SIGN LANG/ORAL INTERPRETER: HCPCS | Mod: U3,TEL

## 2020-10-27 ASSESSMENT — MIFFLIN-ST. JEOR: SCORE: 1128.65

## 2020-10-27 ASSESSMENT — ENCOUNTER SYMPTOMS: SHORTNESS OF BREATH: 1

## 2020-10-27 NOTE — ED PROVIDER NOTES
History   Chief Complaint  Chest Pain    HPI   Dea Burnette is a 75 year old female with a history of hyperlipidemia and hypertension who presents for evaluation of sinus and posterior oropharynx congestion with mild chest tightness and cough.  She notes onset of sinus congestion and mild cough yesterday.  She states her chest tightness is related to cough from the congestion.  She denies shortness of breath, fevers, headache, exposure to person under investigation for COVID-19.  She is concerned for Covid testing.    Allergies:  The patient has no known drug allergies.     Medications:    Pravastatin       Past Medical History:    Diverticulosis   hyperlipidemia   hypertension   Osteoarthritis      Past Surgical History:     section   Phacoemulsification clear cornea with intraocular lens implant-bilateral      Family History:    No past pertinent family history.     Social History:  Tobacco use: Never  Alcohol use: No  Drug use: Never  Marital Status:      Review of Systems   Respiratory: Positive for shortness of breath.    Cardiovascular: Positive for chest pain.   All other systems reviewed and are negative.    Physical Exam     Patient Vitals for the past 24 hrs:   BP Temp Temp src Pulse Resp SpO2 Height Weight   10/27/20 1800 (!) 188/80 -- -- 75 11 98 % -- --   10/27/20 1745 -- -- -- 71 20 97 % -- --   10/27/20 1730 (!) 120/90 -- -- 70 21 96 % -- --   10/27/20 1715 (!) 167/63 -- -- 71 17 97 % -- --   10/27/20 1445 -- 97.4  F (36.3  C) Temporal -- -- -- -- --   10/27/20 1443 (!) 178/58 -- -- 95 20 99 % 1.524 m (5') 71.2 kg (157 lb)     Physical Exam  Nursing notes reviewed. Vitals reviewed.  General: Alert. Well kept.  Eyes:  Conjunctiva non-injected, non-icteric.  Neck/Throat: Moist mucous membranes.  Normal voice.  Cardiac: Regular rhythm. Normal heart sounds with no murmur/rubs/click.  No peripheral edema.  Pulmonary: Clear and equal breath sounds bilaterally. No crackles/rales.  No wheezing  Abdomen: Soft. Non-distended. Non-tender to palpation. No masses. No guarding or rebound.  Musculoskeletal: Normal gross range of motion of all 4 extremities.    Neurological: Alert and oriented x4.   Skin: Warm and dry without rashes or petechiae. Normal appearance of visualized exposed skin.  Psych: Affect normal. Good eye contact.    Emergency Department Course   EKG  Indication: chest pain   Time: 1447  Rate 80 bpm. LA interval 158. QRS duration 78. QT/QTc 374/431. P-R-T axes 50 44 65.  Normal sinus rhythm. Normal ECG.    No significant change as compared to prior, dated 10/23/2020.  Read time: 1653  Read by Lisha Mccord CNP    Imaging:  Radiology findings were communicated with the patient who voiced understanding of the findings.    XR Chest Portable 1 View:   XR Chest Port 1 View   Preliminary Result   IMPRESSION: Single portable AP view of the chest was obtained.   Cardiomediastinal silhouette is within normal limits. No suspicious   focal pulmonary opacities. No significant pleural effusion or   pneumothorax.      as per radiology.     Laboratory:  Laboratory findings were communicated with the patient who voiced understanding of the findings.  Labs Ordered and Resulted from Time of ED Arrival Up to the Time of Departure from the ED   BASIC METABOLIC PANEL - Abnormal; Notable for the following components:       Result Value    Calcium 7.9 (*)     All other components within normal limits   CBC WITH PLATELETS DIFFERENTIAL   TROPONIN I   COVID-19 VIRUS (CORONAVIRUS) BY PCR     Interventions:  Medications - No data to display    Emergency Department Course:  Past medical records, nursing notes, and vitals reviewed.    1640 I physically examined the patient as documented above.     EKG obtained in the ED, see results above.      IV was inserted and blood was drawn for laboratory testing, results above.     The patient was sent for radiographs while in the emergency department, results above.       A Nasopharyngeal swab was obtained here in the ED.     I rechecked the patient and discussed the findings of their workup thus far.     Findings and plan explained to the Patient. Patient discharged home with instructions regarding supportive care, medications, and reasons to return. The importance of close follow-up was reviewed.    I personally reviewed the laboratory and imaging results with the Patient and answered all related questions prior to discharge.     Impression & Plan   Medical Decision Making:  Dea Burnette is a 75 year old female who presents for congestion and associated chest tightness that has been present for two days. The differential diagnosis of chest pain is broad and includes life threatening etiologies such as acute coronary syndrome, myocardial infarction, pulmonary embolism, acute aortic dissection, amongst others. Other causes may include pneumonia, pneumothorax, chest wall source, pericarditis, pleurisy, esophageal spasm, etc. Vitals normal and physical examination notable for mildly erythematous oropharynx without sign of PTA or deep space infection.  HEART score 2 and she is appropriate for outpatient follow-up.  Troponin was within normal limits and EKG shows no ischemia.. Lungs clear to ausculation and chest x-ray without evidence of infiltrate to suggest bacterial or viral pneumonia or other acute findings.  She has no hypoxia, tachycardia, shortness of breath and symptoms are not consistent with pulmonary embolus.  There is no signs at this point of serious bacterial infection such as OM, RPA, epiglottitis, PTA, strep pharyngitis, pneumonia, sinusitis, meningitis, bacteremia. Discussed possibility of COVID-19 and importance of isolation. No hypoxia or respiratory difficulty indicating need for respiratory support and admission at this time. Suspect viral etiology and recommended supportive care including cough suppressants and tylenol as needed. Drink plenty of  fluids. Return to ED for fever, difficulty breathing, confusion, or other new/concerning symptoms. Patient agrees with the plan and all questions/concerns addressed prior to discharge home.     Covid-19  This patient was evaluated during a global COVID-19 pandemic, which necessitated consideration that the patient might be at risk for infection with the SARS-CoV-2 virus that causes COVID-19.   Applicable protocols for evaluation were followed during the patient's care. COVID-19 was considered as part of the patient's evaluation. The plan for testing is: a test was obtained during this visit.      HEART Score  Background  Calculates the overall risk of adverse event in patient's presenting with chest pain.  Based on 5 criteria (each assigned 0-2 points) including suspiciousness of history, EKG, age, risk factors and troponin.    Data  75 year old female  has Backache; Cholelithiasis; Diverticulosis; Elevated BP without diagnosis of hypertension; Fatty liver; Flying phobia; Headache syndrome; Hiatal hernia; LTBI (latent tuberculosis infection); Lumbar spondylosis; Neck pain, chronic; Nonallopathic lesion of sacral region; Obesity, Class II, BMI 35-39.9; Osteopenia; S/P dilatation of esophageal stricture; Subclinical hypothyroidism; and Vitamin D deficiency on their problem list.   reports that she has never smoked. She has never used smokeless tobacco.  family history is not on file.  Lab Results   Component Value Date    TROPI <0.015 10/27/2020     Criteria   0-2 points for each of 5 items (maximum of 10 points):  Score 0- History slightly suspicious for coronary syndrome  Score 0- EKG Normal  Score 1- Age 45 to 65 years old  Score 1- One to 2 risk factors for atherosclerotic disease  Score 0- Within normal limits for troponin levels  Interpretation  Risk of adverse outcome  Heart Score: 2  Total Score 0-3- Adverse Outcome Risk 2.5% - Supports early discharge with appropriate follow-up    Diagnosis:    ICD-10-CM     1. Encounter for laboratory testing for COVID-19 virus  Z20.828    2. Nasal congestion  R09.81    3. Chest tightness  R07.89        Disposition:  Discharged to home.    Discharge Medications:  New Prescriptions    No medications on file     Scribe Disclosure:  I, Kulwinder Mccloud, am serving as a scribe on 10/27/2020 at 5:06 PM to personally document services performed by Lisha Mccord CNP based on my observations and the provider's statements to me.        Lisha Mccord CNP  10/27/20 1914

## 2020-10-27 NOTE — DISCHARGE INSTRUCTIONS
Discharge Instructions  COVID-19    COVID-19 is the disease caused by a new coronavirus. The virus spreads from person-to-person primarily by droplets when an infected person coughs or sneezes and the droplet either lands on another person or that other person touches a surface with the droplet on it. There are tests available to diagnose COVID-19. There is no specific treatment or medicine for the disease.    You may have been diagnosed with COVID, may be being tested for COVID and have a pending test result, or may have been exposed to COVID.    Symptoms of COVID-19  Many people have no symptoms or mild symptoms.  Symptoms may usually appear 4 to 5 days (up to 14 days) after contact with a person with COVID-19. Some people will get severe symptoms and pneumonia. Usual symptoms are:     ? Fever  ? Cough  ? Trouble breathing    Less common symptoms are: Headache, body aches, sore throat, sneezing, diarrhea,loss of taste or smell.    Isolation and Quarantine    You were seen because you have symptoms, had an exposure, or had some other concern about possible COVID. The best way to stop the spread of the virus is to avoid contact with others.  Isolation refers to sick people staying away from people who are not sick. A person in quarantine is limiting activity because they were exposed and are waiting to see if they might become sick.    If you test positive for COVID, you should stay home (isolation) for at least 10 days after your symptoms began, and for 24 hours with no fever and improvement of symptoms--whichever is longer. (Your fever should be gone for 24 hours without using fever-reducing medicine). If you have no symptoms, you should stay home (isolation) for 10 days from the day of the test.    For example, if you have a fever and cough for 6 days, you need to stay home 4 more days with no fever for a total of 10 days. Or, if you have a fever and cough for 10 days, you need to stay home one more day with no  fever for a total of 11 days.    If you have a high-risk exposure to COVID (you spent 15 minutes or more within six feet of somebody who has COVID), you should stay home (quarantine) for 14 days. Even if you test negative for COVID, the CDC recommends a 14-day quarantine from the time of your last exposure to that individual.    If you have symptoms but a negative test, you should stay at home until you are symptom-free and without fever for 24 hours, using the same judgment you would for when it is safe to return to work/school from strep throat, influenza, or the common cold. If you worsen, you should consider being re-evaluated.    If you are being tested for COVID and your test is pending, you should stay home until you know your test result.    How should I protect myself and others?    Do not go to work or school. Have a friend or relative do your shopping. Do not use public transportation (bus, train) or ridesharing (Lyft, Uber).    Separate yourself from other people in your home.?As much as possible, you should stay in one room and away from other people in your home. Also, use a separate bathroom, if possible. Avoid handling pets or other animals while sick.     Wear a facemask if you need to be around other people and cover your mouth and nose with a tissue when you cough or sneeze.     Avoid sharing personal household items. You should not share dishes, drinking glasses, forks/knives/spoons, towels, or bedding with other people in your home. After using these items, they should be washed with soap and water. Clean parts of your home that are touched often (doorknobs, faucets, countertops, etc.) daily.     Wash your hands often with soap and water for at least 20 seconds or use an alcohol-based hand  containing at least 60% alcohol.     Avoid touching your face.    Treat your symptoms. You can take Acetaminophen (Tylenol) to treat body aches and fever as needed for comfort. Ibuprofen (Advil or  Motrin) can be used as well if you still have symptoms after taking Tylenol. Drink fluids. Rest.    Watch for worsening symptoms such as shortness of breath/difficulty breathing or very severe weakness.    Employers/workplaces are being asked by the Centers for Disease Control (CDC) to not request notes/documentation for you to return to work or prove that you were ill. You may choose to show your employer this paperwork. Also, repeat testing should not be required to return to work.    Return to the Emergency Department if:    If you are developing worsening breathing, shortness of breath, or feel worse you should seek medical attention.  If you are uncertain, contact your health care provider/clinic. If you need emergency medical attention, call 911 and tell them you have been ill.    Discharge Instructions  Chest Pain    You have been seen today for chest pain or discomfort.  At this time, your provider has found no signs that your chest pain is due to a serious or life-threatening condition, (or you have declined more testing and/or admission to the hospital). However, sometimes there is a serious problem that does not show up right away. Your evaluation today may not be complete and you may need further testing and evaluation.     Generally, every Emergency Department visit should have a follow-up clinic visit with either a primary or a specialty clinic/provider. Please follow-up as instructed by your emergency provider today.  Return to the Emergency Department if:  Your chest pain changes, gets worse, starts to happen more often, or comes with less activity.  You are newly short of breath.  You get very weak or tired.  You pass out or faint.  You have any new symptoms, like fever, cough, numb legs, or you cough up blood.  You have anything else that worries you.    Until you follow-up with your regular provider, please do the following:  Take one aspirin daily unless you have an allergy or are told not to by  your provider.  If a stress test appointment has been made, go to the appointment.  If you have questions, contact your regular provider.  Follow-up with your regular provider/clinic as directed; this is very important.    If you were given a prescription for medicine here today, be sure to read all of the information (including the package insert) that comes with your prescription.  This will include important information about the medicine, its side effects, and any warnings that you need to know about.  The pharmacist who fills the prescription can provide more information and answer questions you may have about the medicine.  If you have questions or concerns that the pharmacist cannot address, please call or return to the Emergency Department.       Remember that you can always come back to the Emergency Department if you are not able to see your regular provider in the amount of time listed above, if you get any new symptoms, or if there is anything that worries you.  Discharge Instructions  Hypertension - High Blood Pressure    During you visit to the Emergency Department, your blood pressure was higher than the recommended blood pressure.  This may be related to stress, pain, medication or other temporary conditions. In these cases, your blood pressure may return to normal on its own. If you have a history of high blood pressure, you may need to have your provider adjust your medications. Sometimes, your high measurement here may indicate that you have developed high blood pressure that will stay high unless it is treated. As a general rule, high blood pressure causes problems over years rather than days, weeks, or months. So, while it is important to treat blood pressure, it is rarely important to treat blood pressure immediately. Occasionally we will begin a medication in the Emergency Department; more often we will recommend close follow-up for medications with a primary doctor/clinic.    Generally, every  Emergency Department visit should have a follow-up clinic visit with either a primary or a specialty clinic/provider. Please follow-up as instructed by your emergency provider today.    Return to the Emergency Department if you start to have:  A severe headache.  Chest pain.  Shortness of breath.  Weakness or numbness that affects one part of the body.  Confusion.  Vision changes.  Significant swelling of legs and/or eyes.  A reaction to any medication started in the Emergency Department.    What can I do to help myself?  Avoid alcohol.  Take any blood pressure medicine that you are prescribed.  Get a good night s sleep.  Lower your salt intake.  Exercise.  Lose weight.  Manage stress.  See your doctor regularly    If blood pressure medication was started in the Emergency Department:  The medicine may not have an immediate effect. The body and brain determine what blood pressure you have. The medicine s job is to retrain the body s  thermostat  to a lower blood pressure.  You will need to follow up with your provider to see how this medicine is working for you.  If you were given a prescription for medicine here today, be sure to read all of the information (including the package insert) that comes with your prescription.  This will include important information about the medicine, its side effects, and any warnings that you need to know about.  The pharmacist who fills the prescription can provide more information and answer questions you may have about the medicine.  If you have questions or concerns that the pharmacist cannot address, please call or return to the Emergency Department.   Remember that you can always come back to the Emergency Department if you are not able to see your regular provider in the amount of time listed above, if you get any new symptoms, or if there is anything that worries you.

## 2020-10-27 NOTE — ED AVS SNAPSHOT
[facility] Emergency [facility]  [address] [place] [address]  Phone: [phone]  Fax: [phone]                                    Dea Burnette   MRN: [id number]    Department: [facility] Emergency Dept   Date of Visit: 10/27/2020           After Visit Summary Signature Page    I have received my discharge instructions, and my questions have been answered. I have discussed any challenges I see with this plan with the nurse or doctor.    ..........................................................................................................................................  Patient/Patient Representative Signature      ..........................................................................................................................................  Patient Representative Print Name and Relationship to Patient    ..................................................               ................................................  Date                                   Time    ..........................................................................................................................................  Reviewed by Signature/Title    ...................................................              ..............................................  Date                                               Time          22EPIC Rev 08/18

## 2020-10-28 LAB
SARS-COV-2 RNA SPEC QL NAA+PROBE: ABNORMAL
SPECIMEN SOURCE: ABNORMAL

## 2020-10-29 ENCOUNTER — TELEPHONE (OUTPATIENT)
Dept: EMERGENCY MEDICINE | Facility: CLINIC | Age: 75
End: 2020-10-29

## 2020-10-29 NOTE — TELEPHONE ENCOUNTER
"Coronavirus (COVID-19) Notification    Caller Name (Patient, parent, daughter/son, grandparent, etc)  Patient/daughter     Reason for call  Notify of Positive Coronavirus (COVID-19) lab results, assess symptoms,  review [facility] recommendations    Lab Result    Lab test:  2019-nCoV rRt-PCR or SARS-CoV-2 PCR    Oropharyngeal AND/OR nasopharyngeal swabs is POSITIVE for 2019-nCoV RNA/SARS-COV-2 PCR (COVID-19 virus)    RN Recommendations/Instructions per [facility] Coronavirus COVID-19 recommendations    Brief introduction script  Introduce self then review script:  \"I am calling on behalf of [employer].  We were notified that your Coronavirus test (COVID-19) for was POSITIVE for the virus.  I have some information to relay to you but first I wanted to mention that the [place] Dept of Health will be contacting you shortly [it's possible MD already called Patient] to talk to you more about how you are feeling and other people you have had contact with who might now also have the virus.  Also, [facility] is Partnering with the [facility] for Covid-19 research, you may be contacted directly by research staff.\"    Assessment (Inquire about Patient's current symptoms)   Assessment   Current Symptoms at time of phone call: (if no symptoms, document No symptoms]  body aches, headache, dizziness    Symptoms onset (if applicable)  10/25/20     If at time of call, Patients symptoms hare worsened, the Patient should contact 911 or have someone drive them to Emergency Dept promptly:      If Patient calling 911, inform 911 personal that you have tested positive for the Coronavirus (COVID-19).  Place mask on and await 911 to arrive.    If Emergency Dept, If possible, please have another adult drive you to the Emergency Dept but you need to wear mask when in contact with other people.      Review information with Patient    Your result was positive. This means you have COVID-19 (coronavirus).  We have " sent you a letter that reviews the information that I'll be reviewing with you now.    How can I protect others?    If you have symptoms: stay home and away from others (self-isolate) until:    You've had no fever--and no medicine that reduces fever--for 1 full day (24 hours). And       Your other symptoms have gotten better. For example, your cough or breathing has improved. And     At least 10 days have passed since your symptoms started. (If you've been told by a doctor that you have a weak immune system, wait 20 days.)     If you don't have symptoms: Stay home and away from others (self-isolate) until at least 10 days have passed since your first positive COVID-19 test. (Date test collected)    During this time:    Stay in your own room, including for meals. Use your own bathroom if you can.    Stay away from others in your home. No hugging, kissing or shaking hands. No visitors.     Don't go to work, school or anywhere else.     Clean  high touch  surfaces often (doorknobs, counters, handles, etc.). Use a household cleaning spray or wipes. You'll find a full list on the EPA website at www.epa.gov/pesticide-registration/list-n-disinfectants-use-against-sars-cov-2.     Cover your mouth and nose with a mask, tissue or other face covering to avoid spreading germs.    Wash your hands and face often with soap and water.    Caregivers in these groups are at risk for severe illness due to COVID-19:  o People 65 years and older  o People who live in a nursing home or long-term care facility  o People with chronic disease (lung, heart, cancer, diabetes, kidney, liver, immunologic)  o People who have a weakened immune system, including those who:  - Are in cancer treatment  - Take medicine that weakens the immune system, such as corticosteroids  - Had a bone marrow or organ transplant  - Have an immune deficiency  - Have poorly controlled HIV or AIDS  - Are obese (body mass index of 40 or higher)  - Smoke  regularly    Caregivers should wear gloves while washing dishes, handling laundry and cleaning bedrooms and bathrooms.    Wash and dry laundry with special caution. Don't shake dirty laundry, and use the warmest water setting you can.    If you have a weakened immune system, ask your doctor about other actions you should take.    For more tips, go to www.cdc.gov/coronavirus/2019-ncov/downloads/10Things.pdf.    You should not go back to work until you meet the guidelines above for ending your home isolation. You don't need to be retested for COVID-19 before going back to work--studies show that you won't spread the virus if it's been at least 10 days since your symptoms started (or 20 days, if you have a weak immune system).    Employers: This document serves as formal notice of your employee's medical guidelines for going back to work. They must meet the above guidelines before going back to work in person.    How can I take care of myself?    1. Get lots of rest. Drink extra fluids (unless a doctor has told you not to).    2. Take Tylenol (acetaminophen) for fever or pain. If you have liver or kidney problems, ask your family doctor if it's okay to take Tylenol.     Take either:     650 mg (two 325 mg pills) every 4 to 6 hours, or     1,000 mg (two 500 mg pills) every 8 hours as needed.     Note: Don't take more than 3,000 mg in one day. Acetaminophen is found in many medicines (both prescribed and over-the-counter medicines). Read all labels to be sure you don't take too much.    For children, check the Tylenol bottle for the right dose (based on their age or weight).    3. If you have other health problems (like cancer, heart failure, an organ transplant or severe kidney disease): Call your specialty clinic if you don't feel better in the next 2 days.    4. Know when to call 911: Emergency warning signs include:    Trouble breathing or shortness of breath    Pain or pressure in the chest that doesn't go  away    Feeling confused like you haven't felt before, or not being able to wake up    Bluish-colored lips or face    5. Sign up for [employer]. We know it's scary to hear that you have COVID-19. We want to track your symptoms to make sure you're okay over the next 2 weeks. Please look for an email from [employer]--this is a free, online program that we'll use to keep in touch. To sign up, follow the link in the email. Learn more at [email or web address].    Where can I get more information?    [facility]: [email or web address]    Coronavirus Basics: www.health.[facility].[place]./diseases/coronavirus/basics.html    What to Do If You're Sick: www.cdc.gov/coronavirus/2019-ncov/about/steps-when-sick.html    Ending Home Isolation: www.cdc.gov/coronavirus/2019-ncov/hcp/disposition-in-home-patients.html     Caring for Someone with COVID-19: www.cdc.gov/coronavirus/2019-ncov/if-you-are-sick/care-for-someone.html     [facility] clinical trials (COVID-19 research studies): clinicalaffairs.[facility]-clinical-trials     A Positive COVID-19 letter will be sent via [employer] or the mail. (Exception, no letters sent to Presurgerical/Preprocedure Patients)    [Name]  Melina Wilde RN  [employer] - [facility]  COVID19 Results Team RN  Ph# [phone]

## 2020-10-30 ENCOUNTER — APPOINTMENT (OUTPATIENT)
Dept: CT IMAGING | Facility: CLINIC | Age: 75
End: 2020-10-30
Attending: EMERGENCY MEDICINE
Payer: COMMERCIAL

## 2020-10-30 ENCOUNTER — HOSPITAL ENCOUNTER (EMERGENCY)
Facility: CLINIC | Age: 75
Discharge: HOME | End: 2020-10-30
Attending: EMERGENCY MEDICINE | Admitting: EMERGENCY MEDICINE
Payer: COMMERCIAL

## 2020-10-30 ENCOUNTER — NURSE TRIAGE (OUTPATIENT)
Dept: NURSING | Facility: CLINIC | Age: 75
End: 2020-10-30

## 2020-10-30 VITALS
SYSTOLIC BLOOD PRESSURE: 148 MMHG | RESPIRATION RATE: 18 BRPM | TEMPERATURE: 98.1 F | BODY MASS INDEX: 31.25 KG/M2 | DIASTOLIC BLOOD PRESSURE: 71 MMHG | HEART RATE: 62 BPM | WEIGHT: 160 LBS | OXYGEN SATURATION: 96 %

## 2020-10-30 DIAGNOSIS — U07.1 INFECTION DUE TO 2019 NOVEL CORONAVIRUS: ICD-10-CM

## 2020-10-30 LAB
ALBUMIN SERPL-MCNC: 3.1 G/DL (ref 3.4–5)
ALP SERPL-CCNC: 66 U/L (ref 40–150)
ALT SERPL W P-5'-P-CCNC: 27 U/L (ref 0–50)
ANION GAP SERPL CALCULATED.3IONS-SCNC: 2 MMOL/L (ref 3–14)
AST SERPL W P-5'-P-CCNC: 24 U/L (ref 0–45)
BASOPHILS # BLD AUTO: 0 10E9/L (ref 0–0.2)
BASOPHILS NFR BLD AUTO: 0.4 %
BILIRUB SERPL-MCNC: 0.3 MG/DL (ref 0.2–1.3)
BUN SERPL-MCNC: 10 MG/DL (ref 7–30)
CALCIUM SERPL-MCNC: 8 MG/DL (ref 8.5–10.1)
CHLORIDE SERPL-SCNC: 107 MMOL/L (ref 94–109)
CO2 SERPL-SCNC: 28 MMOL/L (ref 20–32)
CREAT SERPL-MCNC: 0.68 MG/DL (ref 0.52–1.04)
CRP SERPL-MCNC: 23.3 MG/L (ref 0–8)
D DIMER PPP FEU-MCNC: 1.5 UG/ML FEU (ref 0–0.5)
DIFFERENTIAL METHOD BLD: ABNORMAL
EOSINOPHIL # BLD AUTO: 0 10E9/L (ref 0–0.7)
EOSINOPHIL NFR BLD AUTO: 0.7 %
ERYTHROCYTE [DISTWIDTH] IN BLOOD BY AUTOMATED COUNT: 13.7 % (ref 10–15)
GFR SERPL CREATININE-BSD FRML MDRD: 85 ML/MIN/{1.73_M2}
GLUCOSE SERPL-MCNC: 100 MG/DL (ref 70–99)
HCT VFR BLD AUTO: 39.7 % (ref 35–47)
HGB BLD-MCNC: 13.1 G/DL (ref 11.7–15.7)
IMM GRANULOCYTES # BLD: 0 10E9/L (ref 0–0.4)
IMM GRANULOCYTES NFR BLD: 0 %
INTERPRETATION ECG - MUSE: NORMAL
LACTATE BLD-SCNC: 0.7 MMOL/L (ref 0.7–2)
LYMPHOCYTES # BLD AUTO: 1 10E9/L (ref 0.8–5.3)
LYMPHOCYTES NFR BLD AUTO: 35.2 %
MCH RBC QN AUTO: 30.7 PG (ref 26.5–33)
MCHC RBC AUTO-ENTMCNC: 33 G/DL (ref 31.5–36.5)
MCV RBC AUTO: 93 FL (ref 78–100)
MONOCYTES # BLD AUTO: 0.3 10E9/L (ref 0–1.3)
MONOCYTES NFR BLD AUTO: 9.3 %
NEUTROPHILS # BLD AUTO: 1.5 10E9/L (ref 1.6–8.3)
NEUTROPHILS NFR BLD AUTO: 54.4 %
NRBC # BLD AUTO: 0 10*3/UL
NRBC BLD AUTO-RTO: 0 /100
PLATELET # BLD AUTO: 156 10E9/L (ref 150–450)
POTASSIUM SERPL-SCNC: 3.6 MMOL/L (ref 3.4–5.3)
PROCALCITONIN SERPL-MCNC: <0.05 NG/ML
PROT SERPL-MCNC: 7 G/DL (ref 6.8–8.8)
RBC # BLD AUTO: 4.27 10E12/L (ref 3.8–5.2)
SODIUM SERPL-SCNC: 137 MMOL/L (ref 133–144)
TROPONIN I SERPL-MCNC: <0.015 UG/L (ref 0–0.04)
WBC # BLD AUTO: 2.7 10E9/L (ref 4–11)

## 2020-10-30 PROCEDURE — 84145 PROCALCITONIN (PCT): CPT | Performed by: EMERGENCY MEDICINE

## 2020-10-30 PROCEDURE — 99285 EMERGENCY DEPT VISIT HI MDM: CPT | Mod: 25

## 2020-10-30 PROCEDURE — 85025 COMPLETE CBC W/AUTO DIFF WBC: CPT | Performed by: EMERGENCY MEDICINE

## 2020-10-30 PROCEDURE — 96360 HYDRATION IV INFUSION INIT: CPT

## 2020-10-30 PROCEDURE — 85379 FIBRIN DEGRADATION QUANT: CPT | Performed by: EMERGENCY MEDICINE

## 2020-10-30 PROCEDURE — 96361 HYDRATE IV INFUSION ADD-ON: CPT

## 2020-10-30 PROCEDURE — 250N000011 HC RX IP 250 OP 636: Performed by: EMERGENCY MEDICINE

## 2020-10-30 PROCEDURE — 86140 C-REACTIVE PROTEIN: CPT | Performed by: EMERGENCY MEDICINE

## 2020-10-30 PROCEDURE — 250N000009 HC RX 250: Performed by: EMERGENCY MEDICINE

## 2020-10-30 PROCEDURE — 80053 COMPREHEN METABOLIC PANEL: CPT | Performed by: EMERGENCY MEDICINE

## 2020-10-30 PROCEDURE — 71275 CT ANGIOGRAPHY CHEST: CPT

## 2020-10-30 PROCEDURE — 258N000003 HC RX IP 258 OP 636: Performed by: EMERGENCY MEDICINE

## 2020-10-30 PROCEDURE — 84484 ASSAY OF TROPONIN QUANT: CPT | Performed by: EMERGENCY MEDICINE

## 2020-10-30 PROCEDURE — 93005 ELECTROCARDIOGRAM TRACING: CPT

## 2020-10-30 PROCEDURE — 83605 ASSAY OF LACTIC ACID: CPT | Performed by: EMERGENCY MEDICINE

## 2020-10-30 RX ORDER — IOPAMIDOL 755 MG/ML
63 INJECTION, SOLUTION INTRAVASCULAR ONCE
Status: COMPLETED | OUTPATIENT
Start: 2020-10-30 | End: 2020-10-30

## 2020-10-30 RX ADMIN — SODIUM CHLORIDE 1000 ML: 9 INJECTION, SOLUTION INTRAVENOUS at 08:14

## 2020-10-30 RX ADMIN — SODIUM CHLORIDE 88 ML: 9 INJECTION, SOLUTION INTRAVENOUS at 09:46

## 2020-10-30 RX ADMIN — IOPAMIDOL 63 ML: 755 INJECTION, SOLUTION INTRAVENOUS at 09:46

## 2020-10-30 ASSESSMENT — ENCOUNTER SYMPTOMS
DIARRHEA: 0
FATIGUE: 1
BACK PAIN: 1
FEVER: 0
HEADACHES: 1
SORE THROAT: 1
COUGH: 1
SHORTNESS OF BREATH: 1
VOMITING: 0

## 2020-10-30 NOTE — ED PROVIDER NOTES
History   Chief Complaint:  Shortness of Breath    A  was used.     Dea Burnette is a 75 year old female with a history of hypertension who presents for evaluation of shortness of breath. She was seen in the ED on 10/27 for COVID testing, and her test returned positive. Chest XR at that time showed no acute abnormalities, CBC and troponin as well as metabolic panel were all unremarkable. They apparently did get a pulse oximeter, and noted lower O2 sats 90-92% this moring. The nurse line recommended they come into the ED for reevaluation. Here the patient complains of mild difficulty breathing although she feels like her breathing is a little better than it was 3 days ago when she was here. She complains of fatigue and some back myalgias. She states she has a mild cough that is mildly productive but no hemoptysis. Not much fever, no vomiting or diarrhea, a little headache and a mild sore throat which she had 3 days ago. She has no known sick contacts or COVID exposures. She did buy a pulse ox and she states her pulse oximeter has been reading 90-92% at home. She does not currently have this pulse oximeter with her. Here on arm monitor she is reading 95-99%. She denies chest pain.    Allergies:  The patient has no known drug allergies.     Medications:    Pravastatin       Past Medical History:    Diverticulosis   hyperlipidemia   hypertension   Osteoarthritis      Past Surgical History:     section   Phacoemulsification clear cornea with intraocular lens implant-bilateral      Family History:    No past pertinent family history.     Social History:  Tobacco use: Never  Alcohol use: No  Drug use: Never  Marital Status:       Review of Systems   Constitutional: Positive for fatigue. Negative for fever.   HENT: Positive for sore throat.    Respiratory: Positive for cough (Productive, no blood) and shortness of breath.    Cardiovascular: Negative for chest pain.    Gastrointestinal: Negative for diarrhea and vomiting.   Musculoskeletal: Positive for back pain.   Neurological: Positive for headaches.   All other systems reviewed and are negative.      Physical Exam     Patient Vitals for the past 24 hrs:   BP Temp Temp src Pulse Resp SpO2 Weight   10/30/20 1030 (!) 148/71 -- -- 62 18 96 % --   10/30/20 1000 -- -- -- 64 18 -- --   10/30/20 0930 125/82 -- -- 61 19 96 % --   10/30/20 0900 137/50 -- -- 60 13 97 % --   10/30/20 0830 135/57 -- -- -- -- 98 % --   10/30/20 0754 (!) 141/68 98.1  F (36.7  C) Oral 71 17 96 % 72.6 kg (160 lb)       Physical Exam  Physical Exam   Constitutional:  Patient is oriented to person, place, and time. They appear well-developed and well-nourished. Mild distress secondary to shortness of breath   HENT:   Mouth/Throat:   Oropharynx is clear and moist.   Eyes:    Conjunctivae normal and EOM are normal. Pupils are equal, round, and reactive to light.   Neck:    Normal range of motion.   Cardiovascular: Normal rate, regular rhythm and normal heart sounds.  Exam reveals no gallop and no friction rub.  No murmur heard.  Pulmonary/Chest:  No respiratory distress, no tachypnea. Slightly diminished breath sounds but no wheezes rales or rhonchi.   Abdominal:   Soft. Bowel sounds are normal. Patient exhibits no mass. There is no tenderness. There is no rebound and no guarding.   Musculoskeletal:  Normal range of motion. Patient exhibits no edema.   Neurological:   Patient is alert and oriented to person, place, and time. Patient has normal strength. No cranial nerve deficit or sensory deficit. GCS 15  Skin:   Skin is warm and dry. No rash noted. No erythema.   Psychiatric:   Patient has a normal mood and affect. Patient's behavior is normal. Judgment and thought content normal.     Emergency Department Course     ECG:  Indication: Shortness of breath  Time: 0831  Vent. Rate 63 bpm. NJ interval 158. QRS duration 80. QT/QTc 410/419. P-R-T axis 49 23 60.   NSR,  Normal ECG   Read time: 0850    Imaging:  Radiology findings were communicated with the patient who voiced understanding of the findings.    CT Chest PE w/ IV contrast:   1.  No evidence of pulmonary embolism or acute thoracic aortic   abnormality.   2.  Patchy rounded bilateral groundglass opacities is an appearance   typical of COVID-19.   Commonly reported imaging features of (COVID-19) pneumonia are   present. Influenza pneumonia and organizing pneumonia as can be seen   in the setting of drug toxicity and connective tissue disease cancer   causing a similar imaging pattern, as per radiology.    Laboratory:  Laboratory findings were communicated with the patient who voiced understanding of the findings.    CBC: WBC: 2.7 (L), HGB: 13.1, PLT: 156    CMP: Glucose 100 (H), Anion gap 2 (L), Calcium 8.0 (L), Albumin 3.1 (L), o/w WNL (Creatinine: 0.68)    Troponin (Collected 0810): <0.015    Lactic Acid (Resulted 0828): 0.7    D-Dimer: 1.5 (H)    CRP inflammation: 23.3 (H)    Procalcitonin: <0.05    Interventions:  0814: NS, 1 L, IV    Emergency Department Course:  Past medical records, nursing notes, and vitals reviewed.    0756: I performed an exam of the patient as documented above.     EKG obtained in the ED, see results above.   IV was inserted and blood was drawn for laboratory testing, results above.  The patient was sent for a CT while in the emergency department, results above.     1033: I rechecked the patient and discussed the results of her workup thus far with daughter and  via phone.    Findings and plan explained to the Patient. Patient discharged home with instructions regarding supportive care, medications, and reasons to return. The importance of close follow-up was reviewed.     I personally reviewed the laboratory and imaging results with the Patient and answered all related questions prior to discharge.     Impression & Plan     Covid-19  Dea Burnette was evaluated during a  global COVID-19 pandemic, which necessitated consideration that the patient might be at risk for infection with the SARS-CoV-2 virus that causes COVID-19.   Applicable protocols for evaluation were followed during the patient's care.   COVID-19 was considered as part of the patient's evaluation. The plan for testing is:  a test was obtained during this visit.    Medical Decision Making:  Dea Burnette is a 75 year old female with known COVID diagnosed 3 days ago coming into the ED as her pulse ox that she has at home read 90-92%. This is somewhat counter to how she feels, as she feels that her breathing is a little bit better. She has a mild cough that is mildly productive and fatigue and body aches but otherwise looks good. Her vital signs her find her to afebrile, she is satting appropriately and has been on the pulse ox with no O2 her entire time here. Her BP was also noted to be normal. Basic blood work was obtained, her WBC was low which is consistent with COVID. She is not acutely anemia. Her lactic acid is WNL. Her CRP is elevated, again consistent with COVID. She had no acute metabolic derangement, liver function is noted to be normal. A D-dimer was performed and this is elevated so a CT of her chest was performed. CT of her chest is consistent with COVID. There is no consolidation, PE, or dissection. At this point I do not see any reason why she needs to be hospitalized. I spoke with her daughter on the phone as well who feels comfortable with this plan. They will return if she has fever that is not responsive to antipyretics, vomiting, unable to keep fluids down, or worsening shortness of breath. Patient was also counseled regarding isolation protocol.     Diagnosis:    ICD-10-CM    1. Infection due to 2019 novel coronavirus  U07.1 Procalcitonin       Disposition:  Discharged to home.    Scribe Disclosure:  Alanna TAN, am serving as a scribe at 7:55 AM on 10/30/2020 to document services  personally performed by Poonam Leavitt MD based on my observations and the provider's statements to me.      Poonam Leavitt MD  10/30/20 1122

## 2020-10-30 NOTE — ED AVS SNAPSHOT
[facility] Emergency Dept  [address] [address]  Phone: [phone]  Fax: [phone]                                    Dea Burnette   MRN: [id number]    Department: [facility] Emergency Dept   Date of Visit: 10/30/2020           After Visit Summary Signature Page    I have received my discharge instructions, and my questions have been answered. I have discussed any challenges I see with this plan with the nurse or doctor.    ..........................................................................................................................................  Patient/Patient Representative Signature      ..........................................................................................................................................  Patient Representative Print Name and Relationship to Patient    ..................................................               ................................................  Date                                   Time    ..........................................................................................................................................  Reviewed by Signature/Title    ...................................................              ..............................................  Date                                               Time          22EPIC Rev 08/18

## 2020-10-30 NOTE — ED TRIAGE NOTES
SOB  - pt tested positive couple days ago for COVID - daughter states lower O2 sats this morning - 90-92% - daughter called nurse line told to come to ER for eval   Denies chest pain

## 2020-10-30 NOTE — TELEPHONE ENCOUNTER
100.4, o2 sats at 95%. 94% at 12:30 a.m. 5:40 a.m. at 92% 98 F. Advised to bring the pulse ox with her to the ER for evaluation.   They will do that. She is positive for covid19.  Opal Vega RN  [facility]      Reason for Disposition    Patient sounds very sick or weak to the triager    Additional Information    Negative: SEVERE difficulty breathing (e.g., struggling for each breath, speaks in single words)    Negative: Difficult to awaken or acting confused (e.g., disoriented, slurred speech)    Negative: Bluish (or gray) lips or face now    Negative: Shock suspected (e.g., cold/pale/clammy skin, too weak to stand, low BP, rapid pulse)    Negative: Sounds like a life-threatening emergency to the triager    Negative: [1] COVID-19 exposure AND [2] no symptoms    Negative: COVID-19 and Breastfeeding, questions about    Negative: [1] Adult with possible COVID-19 symptoms AND [2] triager concerned about severity of symptoms or other causes    Negative: SEVERE or constant chest pain or pressure (Exception: mild central chest pain, present only when coughing)    Negative: MODERATE difficulty breathing (e.g., speaks in phrases, SOB even at rest, pulse 100-120)    Protocols used: CORONAVIRUS (COVID-19) DIAGNOSED OR [id number] 8.4.20

## 2021-06-08 ENCOUNTER — TRANSFERRED RECORDS (OUTPATIENT)
Dept: HEALTH INFORMATION MANAGEMENT | Facility: CLINIC | Age: 76
End: 2021-06-08

## 2021-07-17 PROCEDURE — 93005 ELECTROCARDIOGRAM TRACING: CPT

## 2021-07-17 PROCEDURE — 99285 EMERGENCY DEPT VISIT HI MDM: CPT | Mod: 25

## 2021-07-18 ENCOUNTER — HOSPITAL ENCOUNTER (EMERGENCY)
Facility: CLINIC | Age: 76
Discharge: HOME | End: 2021-07-18
Attending: EMERGENCY MEDICINE | Admitting: EMERGENCY MEDICINE
Payer: COMMERCIAL

## 2021-07-18 ENCOUNTER — APPOINTMENT (OUTPATIENT)
Dept: CT IMAGING | Facility: CLINIC | Age: 76
End: 2021-07-18
Attending: EMERGENCY MEDICINE
Payer: COMMERCIAL

## 2021-07-18 VITALS
RESPIRATION RATE: 22 BRPM | OXYGEN SATURATION: 99 % | TEMPERATURE: 97.6 F | DIASTOLIC BLOOD PRESSURE: 79 MMHG | HEART RATE: 59 BPM | SYSTOLIC BLOOD PRESSURE: 154 MMHG

## 2021-07-18 DIAGNOSIS — K44.9 ESOPHAGUS HERNIA: ICD-10-CM

## 2021-07-18 DIAGNOSIS — R10.9 FLANK PAIN: ICD-10-CM

## 2021-07-18 DIAGNOSIS — K59.00 CONSTIPATION, UNSPECIFIED CONSTIPATION TYPE: ICD-10-CM

## 2021-07-18 DIAGNOSIS — K80.20 GALLSTONES: ICD-10-CM

## 2021-07-18 LAB
ALBUMIN SERPL-MCNC: 3.6 G/DL (ref 3.4–5)
ALBUMIN UR-MCNC: NEGATIVE MG/DL
ALP SERPL-CCNC: 90 U/L (ref 40–150)
ALT SERPL W P-5'-P-CCNC: 25 U/L (ref 0–50)
ANION GAP SERPL CALCULATED.3IONS-SCNC: 2 MMOL/L (ref 3–14)
APPEARANCE UR: CLEAR
AST SERPL W P-5'-P-CCNC: 28 U/L (ref 0–45)
BASOPHILS # BLD AUTO: 0 10E3/UL (ref 0–0.2)
BASOPHILS NFR BLD AUTO: 1 %
BILIRUB DIRECT SERPL-MCNC: <0.1 MG/DL (ref 0–0.2)
BILIRUB SERPL-MCNC: 0.3 MG/DL (ref 0.2–1.3)
BILIRUB UR QL STRIP: NEGATIVE
BUN SERPL-MCNC: 13 MG/DL (ref 7–30)
CALCIUM SERPL-MCNC: 9.7 MG/DL (ref 8.5–10.1)
CHLORIDE BLD-SCNC: 105 MMOL/L (ref 94–109)
CO2 SERPL-SCNC: 32 MMOL/L (ref 20–32)
COLOR UR AUTO: NORMAL
CREAT SERPL-MCNC: 0.77 MG/DL (ref 0.52–1.04)
EOSINOPHIL # BLD AUTO: 0.1 10E3/UL (ref 0–0.7)
EOSINOPHIL NFR BLD AUTO: 2 %
ERYTHROCYTE [DISTWIDTH] IN BLOOD BY AUTOMATED COUNT: 13.1 % (ref 10–15)
GFR SERPL CREATININE-BSD FRML MDRD: 75 ML/MIN/1.73M2
GLUCOSE BLD-MCNC: 103 MG/DL (ref 70–99)
GLUCOSE UR STRIP-MCNC: NEGATIVE MG/DL
HCT VFR BLD AUTO: 39.4 % (ref 35–47)
HGB BLD-MCNC: 12.6 G/DL (ref 11.7–15.7)
HGB UR QL STRIP: NEGATIVE
HOLD SPECIMEN: NORMAL
IMM GRANULOCYTES # BLD: 0 10E3/UL
IMM GRANULOCYTES NFR BLD: 0 %
KETONES UR STRIP-MCNC: NEGATIVE MG/DL
LEUKOCYTE ESTERASE UR QL STRIP: NEGATIVE
LIPASE SERPL-CCNC: 61 U/L (ref 73–393)
LYMPHOCYTES # BLD AUTO: 2 10E3/UL (ref 0.8–5.3)
LYMPHOCYTES NFR BLD AUTO: 32 %
MCH RBC QN AUTO: 29.8 PG (ref 26.5–33)
MCHC RBC AUTO-ENTMCNC: 32 G/DL (ref 31.5–36.5)
MCV RBC AUTO: 93 FL (ref 78–100)
MONOCYTES # BLD AUTO: 0.4 10E3/UL (ref 0–1.3)
MONOCYTES NFR BLD AUTO: 7 %
NEUTROPHILS # BLD AUTO: 3.7 10E3/UL (ref 1.6–8.3)
NEUTROPHILS NFR BLD AUTO: 58 %
NITRATE UR QL: NEGATIVE
NRBC # BLD AUTO: 0 10E3/UL
NRBC BLD AUTO-RTO: 0 /100
PH UR STRIP: 7 [PH] (ref 5–7)
PLATELET # BLD AUTO: 249 10E3/UL (ref 150–450)
POTASSIUM BLD-SCNC: 4.8 MMOL/L (ref 3.4–5.3)
PROT SERPL-MCNC: 7.6 G/DL (ref 6.8–8.8)
RBC # BLD AUTO: 4.23 10E6/UL (ref 3.8–5.2)
SODIUM SERPL-SCNC: 139 MMOL/L (ref 133–144)
SP GR UR STRIP: 1 (ref 1–1.03)
UROBILINOGEN UR STRIP-MCNC: NORMAL MG/DL
WBC # BLD AUTO: 6.3 10E3/UL (ref 4–11)

## 2021-07-18 PROCEDURE — 74176 CT ABD & PELVIS W/O CONTRAST: CPT

## 2021-07-18 PROCEDURE — 36415 COLL VENOUS BLD VENIPUNCTURE: CPT | Performed by: EMERGENCY MEDICINE

## 2021-07-18 PROCEDURE — 80048 BASIC METABOLIC PNL TOTAL CA: CPT | Performed by: EMERGENCY MEDICINE

## 2021-07-18 PROCEDURE — 93005 ELECTROCARDIOGRAM TRACING: CPT

## 2021-07-18 PROCEDURE — 82248 BILIRUBIN DIRECT: CPT | Performed by: EMERGENCY MEDICINE

## 2021-07-18 PROCEDURE — 81003 URINALYSIS AUTO W/O SCOPE: CPT | Performed by: EMERGENCY MEDICINE

## 2021-07-18 PROCEDURE — 36592 COLLECT BLOOD FROM PICC: CPT | Performed by: EMERGENCY MEDICINE

## 2021-07-18 PROCEDURE — 83690 ASSAY OF LIPASE: CPT | Performed by: EMERGENCY MEDICINE

## 2021-07-18 PROCEDURE — 250N000013 HC RX MED GY IP 250 OP 250 PS 637: Performed by: EMERGENCY MEDICINE

## 2021-07-18 PROCEDURE — 85025 COMPLETE CBC W/AUTO DIFF WBC: CPT | Performed by: EMERGENCY MEDICINE

## 2021-07-18 RX ORDER — MAGNESIUM CARB/ALUMINUM HYDROX 105-160MG
296 TABLET,CHEWABLE ORAL ONCE
Qty: 296 ML | Refills: 0 | Status: SHIPPED | OUTPATIENT
Start: 2021-07-18 | End: 2021-07-18

## 2021-07-18 RX ORDER — ACETAMINOPHEN 500 MG
1000 TABLET ORAL ONCE
Status: COMPLETED | OUTPATIENT
Start: 2021-07-18 | End: 2021-07-18

## 2021-07-18 RX ORDER — POLYETHYLENE GLYCOL 3350 17 G/17G
1 POWDER, FOR SOLUTION ORAL DAILY
Qty: 510 G | Refills: 0 | Status: SHIPPED | OUTPATIENT
Start: 2021-07-18 | End: 2021-08-17

## 2021-07-18 RX ORDER — ASPIRIN 81 MG
100 TABLET, DELAYED RELEASE (ENTERIC COATED) ORAL DAILY
Qty: 10 TABLET | Refills: 0 | Status: SHIPPED | OUTPATIENT
Start: 2021-07-18

## 2021-07-18 RX ADMIN — ACETAMINOPHEN 1000 MG: 325 TABLET, FILM COATED ORAL at 04:04

## 2021-07-18 ASSESSMENT — ENCOUNTER SYMPTOMS
BACK PAIN: 1
VOMITING: 0
FEVER: 0
DYSURIA: 1
FREQUENCY: 1
NAUSEA: 0
DIARRHEA: 0
FLANK PAIN: 1
NUMBNESS: 0

## 2021-07-18 NOTE — ED PROVIDER NOTES
History   Chief Complaint:  Flank Pain and Dysuria       The history is provided by a relative and the patient. The history is limited by a language barrier. A  was used (daughter).      Dea Burnette is a 76 year old female with history of diverticulosis, hyperlipidemia, and cholelithiasis who presents with right flank pain. The patient states that for the past few days she has had right flank pain and right lower back pain, as well as dysuria and frequency of urination. She has not lifted any heavy objects recently. The patient denies nausea, vomiting, chest pain, fever and diarrhea. She also denies paresthesias, focal weakness, bladder/bowel incontinence.      Review of Systems   Constitutional: Negative for fever.   Cardiovascular: Negative for chest pain.   Gastrointestinal: Negative for diarrhea, nausea and vomiting.   Genitourinary: Positive for dysuria, flank pain (right-sided) and frequency.   Musculoskeletal: Positive for back pain.   Neurological: Negative for numbness.   All other systems reviewed and are negative.      Allergies:  The patient does not have any allergies    Medications:  Pravastatin    Past Medical History:    Cholelithiasis  Diverticulosis  Hyperlipidemia  Noninfectious iletis   Obesity   Osteoarthritis  Osteopenia  Fatty Liver  Hiatal hernia  Latent tuberculosis infection  Hypothyroidism  Lumbar spondylosis  Non allopathic lesion of sacral region  Cholelithiasis  Diverticulosis    Past Surgical History:    Phacoemulsification clear cornea with standard intraocular lens implant x2  Appendectomy    Family History:    Mother: cancer    Social History:  The patient presents to the ED with her daughter    Physical Exam     Patient Vitals for the past 24 hrs:   BP Temp Temp src Pulse Resp SpO2   21 0415 (!) 179/75 -- -- 64 -- --   21 0407 (!) 169/73 -- -- 89 22 99 %   21 0400 (!) 175/74 -- -- 63 -- --   21 0345 (!) 166/76 -- --  59 -- --   07/18/21 0337 (!) 150/73 -- -- 59 -- --   07/17/21 2358 (!) 201/88 -- -- -- -- --   07/17/21 2357 -- 97.6  F (36.4  C) Temporal 65 18 99 %       Physical Exam  Nursing note and vitals reviewed.  Constitutional: Well nourished.   Eyes: Conjunctiva normal.  Pupils are equal, round, and reactive to light.   ENT: Nose normal. Mucous membranes pink and moist.    Neck: Normal range of motion.  CVS: Normal rate, regular rhythm.  Normal heart sounds.    Pulmonary: Lungs clear to auscultation bilaterally. No wheezes/rales/rhonchi.  GI: Abdomen soft. Nontender, nondistended. No rigidity or guarding.  Mild R. CVA tenderness  MSK: No calf tenderness or swelling.  No c/t/l bony tenderness  Neuro: Alert. Follows simple commands.  Skin: Skin is warm and dry. No rash noted.   Psychiatric: Normal affect.       Emergency Department Course   ECG  ECG taken at 0405, ECG read at 0410  Normal sinus rhythm  Normal ECG   Rate 60 bpm. CT interval 180 ms. QRS duration 82 ms. QT/QTc 448/448 ms. P-R-T axes 28 17 54.     Imaging:    Abd/pelvis CT no contrast - Stone Protocol  1.  No urinary collecting system dilatation or calculi.  2.  1.6 cm gallstone. No biliary dilatation or overt inflammatory change involving the gallbladder.  3.  Minimal sliding esophageal hiatal hernia.  4.  Colonic diverticulosis without diverticulitis. Large amount of stool in the colon.    As per Radiology      Laboratory:  BMP: Anion gap: 2 (L), Glucose: 103 (H) o/w WNL (Creatinine 0.77)     CBC: WBC 6.3, HGB 12.6,     Hepatic Panel: WNL    Lipase: 61 (L)    UA with microscopic: WNL       Emergency Department Course:    Reviewed:  I reviewed nursing notes, vitals, past medical history and care everywhere    Assessments:  0330 I obtained history and examined the patient as noted above.     0543 I rechecked the patient and explained findings. We discussed discharge and the patient is comfortable going home.      Interventions:  0404 Acetaminophen 1000  mg PO    Disposition:  The patient was discharged to home.       Impression & Plan     Medical Decision Making:  Patient is a 76-year-old female presenting with back pain predominantly flank pain.  She is nontoxic though notably hypertensive on arrival.  This down trended during her time in the ED.  Labs reviewed, no evidence to suggest underlying sepsis.  UA negative for infection.  She did undergo formal CT which is fortunately without evidence of obstructive uropathy.  Incidental esophageal hernia as well as gallstones identified.  LFTs/lipase reassuring.  She has no significant right upper quadrant pain and I doubt that her presentation is secondary to biliary colic.  On CT she was noted to have large stool burden as well.  I did offer enema during patient's time in the ED though she is declining.  I will initiate stool softener as well as MiraLAX and give mag citrate on dispo.  I did discuss that constipation can cause back pain on occasion for some patients.  She has no reproducible bony tenderness and is otherwise neurologically intact.  I have a low clinical suspicion for bony or spinal cord pathology.  There is no evidence on exam to suggest cauda equina, discitis or other more serious etiology.  Planning close outpatient follow-up for reevaluation.  Return precautions given.      Diagnosis:    ICD-10-CM    1. Flank pain  R10.9    2. Constipation, unspecified constipation type  K59.00    3. Gallstones  K80.20    4. Esophagus hernia  K44.9        Discharge Medications:  New Prescriptions    DOCUSATE SODIUM (COLACE) 100 MG TABLET    Take 1 tablet (100 mg) by mouth daily    MAGNESIUM CITRATE 1.745 GM/30ML SOLUTION    Take 296 mLs by mouth once for 1 dose    POLYETHYLENE GLYCOL (MIRALAX) 17 GM/DOSE POWDER    Take 17 g (1 capful) by mouth daily       Scribe Disclosure:  Lavon TAN, am serving as a scribe at 3:27 AM on 7/18/2021 to document services personally performed by Rachel Weston DO based on  my observations and the provider's statements to me.            Rachel Weston,   07/18/21 0633

## 2021-07-19 LAB
ATRIAL RATE - MUSE: 60 BPM
DIASTOLIC BLOOD PRESSURE - MUSE: NORMAL MMHG
INTERPRETATION ECG - MUSE: NORMAL
P AXIS - MUSE: 28 DEGREES
PR INTERVAL - MUSE: 180 MS
QRS DURATION - MUSE: 82 MS
QT - MUSE: 448 MS
QTC - MUSE: 448 MS
R AXIS - MUSE: 17 DEGREES
SYSTOLIC BLOOD PRESSURE - MUSE: NORMAL MMHG
T AXIS - MUSE: 54 DEGREES
VENTRICULAR RATE- MUSE: 60 BPM

## 2021-09-07 ENCOUNTER — HOSPITAL ENCOUNTER (EMERGENCY)
Facility: CLINIC | Age: 76
Discharge: HOME | End: 2021-09-08
Attending: EMERGENCY MEDICINE | Admitting: EMERGENCY MEDICINE
Payer: COMMERCIAL

## 2021-09-07 DIAGNOSIS — N30.01 ACUTE CYSTITIS WITH HEMATURIA: ICD-10-CM

## 2021-09-07 LAB
ALBUMIN UR-MCNC: 70 MG/DL
APPEARANCE UR: ABNORMAL
BILIRUB UR QL STRIP: NEGATIVE
COLOR UR AUTO: ABNORMAL
GLUCOSE UR STRIP-MCNC: NEGATIVE MG/DL
HGB UR QL STRIP: ABNORMAL
KETONES UR STRIP-MCNC: NEGATIVE MG/DL
LEUKOCYTE ESTERASE UR QL STRIP: ABNORMAL
MUCOUS THREADS #/AREA URNS LPF: PRESENT /LPF
NITRATE UR QL: NEGATIVE
PH UR STRIP: 6 [PH] (ref 5–7)
RBC URINE: >182 /HPF
SP GR UR STRIP: 1.02 (ref 1–1.03)
UROBILINOGEN UR STRIP-MCNC: NORMAL MG/DL
WBC CLUMPS #/AREA URNS HPF: PRESENT /HPF
WBC URINE: >182 /HPF

## 2021-09-07 PROCEDURE — 87086 URINE CULTURE/COLONY COUNT: CPT | Performed by: EMERGENCY MEDICINE

## 2021-09-07 PROCEDURE — 81001 URINALYSIS AUTO W/SCOPE: CPT | Performed by: EMERGENCY MEDICINE

## 2021-09-07 PROCEDURE — 99283 EMERGENCY DEPT VISIT LOW MDM: CPT

## 2021-09-07 RX ORDER — CEPHALEXIN 500 MG/1
500 CAPSULE ORAL ONCE
Status: COMPLETED | OUTPATIENT
Start: 2021-09-08 | End: 2021-09-08

## 2021-09-07 RX ORDER — CEPHALEXIN 500 MG/1
500 CAPSULE ORAL 2 TIMES DAILY
Qty: 14 CAPSULE | Refills: 0 | Status: SHIPPED | OUTPATIENT
Start: 2021-09-07 | End: 2021-09-14

## 2021-09-07 ASSESSMENT — ENCOUNTER SYMPTOMS
FEVER: 0
HEMATURIA: 1
DIFFICULTY URINATING: 1

## 2021-09-08 VITALS
OXYGEN SATURATION: 99 % | RESPIRATION RATE: 18 BRPM | DIASTOLIC BLOOD PRESSURE: 86 MMHG | TEMPERATURE: 97.8 F | SYSTOLIC BLOOD PRESSURE: 175 MMHG | HEART RATE: 66 BPM

## 2021-09-08 PROCEDURE — 250N000013 HC RX MED GY IP 250 OP 250 PS 637: Performed by: EMERGENCY MEDICINE

## 2021-09-08 RX ADMIN — CEPHALEXIN 500 MG: 500 CAPSULE ORAL at 00:06

## 2021-09-08 NOTE — ED PROVIDER NOTES
History     Chief Complaint:  UTI     HPI   Dea Burnette is a 76 year old female who presents with hematuria and frequent urinations. The patient stated that earlier today, she started experiencing burning when urinating, she started feeling urgency to urinate as well as noticing blood in the urine. She denied fever or vomiting.     Review of Systems   Constitutional: Negative for fever.   Genitourinary: Positive for difficulty urinating and hematuria.   All other systems reviewed and are negative.    Allergies:  The patient has no known allergies.     Medications:  Pravastatin     Past Medical History:    Back pain  Cholelithiasis  Diverticulosis   Hyperlipidemia  Obesity  Osteoarthritis  Osteopenia  Vit D deficiency     Past Surgical History:      Dilation of esophagus    Family History:    The patient denies past family history.     Social History:  The patient was brought to the emergency department by private vehicle.  The patient presents to the emergency department with her daughter who was translating for her.    Physical Exam     Patient Vitals for the past 24 hrs:   BP Temp Temp src Pulse Resp SpO2   21 0005  175/86 -- -- 66 -- 99 %   21 2224  183/74 97.8  F (36.6  C) Temporal -- -- --   21 2222 -- -- -- 70 18 100 %     Physical Exam  Nursing note and vitals reviewed.  Constitutional: Cooperative. Sitting up in bed comfortably.   HENT:   Mouth/Throat: Mucous membranes are normal.   Cardiovascular: Normal rate, regular rhythm and normal heart sounds.  No murmur.  Pulmonary/Chest: Effort normal and breath sounds normal. No respiratory distress. No wheezes. No rales.   Abdominal: Soft. Normal appearance and bowel sounds are normal. No distension. There is no tenderness.    Neurological: Alert. Oriented x4  Skin: Skin is warm and dry. .   Psychiatric: Normal mood and affect.     Emergency Department Course     Laboratory:  UA: Blood: Large, Protein Albumin: 70, Leukocyte  Esterase: Large, WBC: >182 (H), WBC clumps: Present, RBC: >182 (H), Mucous: Present, o/w Negative    Urine Culture Aerobic Bacterial: Pending    Reviewed:  I reviewed nursing notes, vitals, past medical history and care everywhere    Assessments/Consults  2355 I obtained history and examined the patient as noted above.     Interventions:  0010 Keflex 500 mg oral    Disposition:  The patient was discharged to home.     Impression & Plan     Medical Decision Making:  Dea Burnette is a 76 year old female who presents for evaluation of dysuria.  This clinically is consistent with a urinary tract infection.  Urinalysis confirms the probable infection.  There has been no fever, flank pain or significant abdominal pain.  There is no clinical evidence of pyelonephritis, appendicitis, colitis, diverticulitis or any intraabdominal catastrophe. The patient will be started on antibiotics for the infection. Return if increasing pain, vomiting, fever, or inability to tolerate the oral antibiotic.  Follow up with primary physician is indicated if not improving in 2-3 days.     Diagnosis:    ICD-10-CM    1. Acute cystitis with hematuria  N30.01        Discharge Medications:  Discharge Medication List as of 9/8/2021 12:00 AM      START taking these medications    Details   cephALEXin (KEFLEX) 500 MG capsule Take 1 capsule (500 mg) by mouth 2 times daily for 7 days, Disp-14 capsule, R-0, Local Print             Scribe Disclosure:  Chandrakant TAN, am serving as a scribe at 11:52 PM on 9/7/2021 to document services personally performed by Americo Chin MD based on my observations and the provider's statements to me.              Americo Chin MD  09/08/21 0021

## 2021-09-08 NOTE — ED TRIAGE NOTES
C/O burning with urination that started a few hours ago with some blood in urine. Denies flank pain or fevers. Denies hx of UTI.

## 2021-09-09 LAB — BACTERIA UR CULT: NO GROWTH

## 2021-09-09 NOTE — RESULT ENCOUNTER NOTE
"Final urine culture report shows \"NO GROWTH\" and is NEGATIVE.  [facility] Emergency Dept discharge antibiotic: Cephalexin 500 mg PO BID for 7 days  [facility] Emergency Dept discharge Rx antibiotic for UTI only (Yes/No): Yes  Patient took antibiotic within 3 days prior to urine culture collection (Yes/no): unknown  Recommendations in treatment per [facility] ED Lab result Urine culture protocol.    "

## 2021-09-11 ENCOUNTER — TELEPHONE (OUTPATIENT)
Dept: EMERGENCY MEDICINE | Facility: CLINIC | Age: 76
End: 2021-09-11

## 2021-09-11 NOTE — TELEPHONE ENCOUNTER
"[facility] Emergency Department/Urgent Care Lab result notification:    Reason for call  Notify of lab results, assess symptoms,  review ED providers recommendations (if necessary) and advise per ED lab result f/u protocol.    Lab result  Final urine culture report shows \"NO GROWTH\" and is NEGATIVE.  [facility] Emergency Dept discharge antibiotic: Cephalexin 500 mg PO BID for 7 days  [facility] Emergency Dept discharge Rx antibiotic for UTI only (Yes/No): Yes  Patient took antibiotic within 3 days prior to urine culture collection (Yes/no): unknown  Recommendations in treatment per [facility] ED Lab result Urine culture protocol.      5:03 via  [id number] called home number and someone answered but when  introduced herself, the person disconnected the call.  Stephanie Foster RN  [facility] Result RN  [facility] Emergency Dept Lab Result RN  Ph# [phone]    "

## 2021-09-25 ENCOUNTER — HEALTH MAINTENANCE LETTER (OUTPATIENT)
Age: 76
End: 2021-09-25

## 2022-12-26 ENCOUNTER — HEALTH MAINTENANCE LETTER (OUTPATIENT)
Age: 77
End: 2022-12-26

## 2023-01-02 LAB
ALBUMIN SERPL-MCNC: 3.3 G/DL (ref 3.4–5)
ALBUMIN UR-MCNC: NEGATIVE MG/DL
ALP SERPL-CCNC: 71 U/L (ref 40–150)
ALT SERPL W P-5'-P-CCNC: 16 U/L (ref 0–50)
ANION GAP SERPL CALCULATED.3IONS-SCNC: 8 MMOL/L (ref 3–14)
APPEARANCE UR: CLEAR
AST SERPL W P-5'-P-CCNC: 18 U/L (ref 0–45)
BASOPHILS # BLD AUTO: 0 10E3/UL (ref 0–0.2)
BASOPHILS NFR BLD AUTO: 1 %
BILIRUB SERPL-MCNC: 0.2 MG/DL (ref 0.2–1.3)
BILIRUB UR QL STRIP: NEGATIVE
BUN SERPL-MCNC: 29 MG/DL (ref 7–30)
CALCIUM SERPL-MCNC: 9.1 MG/DL (ref 8.5–10.1)
CHLORIDE BLD-SCNC: 105 MMOL/L (ref 94–109)
CO2 SERPL-SCNC: 27 MMOL/L (ref 20–32)
COLOR UR AUTO: ABNORMAL
CREAT SERPL-MCNC: 0.79 MG/DL (ref 0.52–1.04)
EOSINOPHIL # BLD AUTO: 0.1 10E3/UL (ref 0–0.7)
EOSINOPHIL NFR BLD AUTO: 2 %
ERYTHROCYTE [DISTWIDTH] IN BLOOD BY AUTOMATED COUNT: 12.9 % (ref 10–15)
GFR SERPL CREATININE-BSD FRML MDRD: 77 ML/MIN/1.73M2
GLUCOSE BLD-MCNC: 113 MG/DL (ref 70–99)
GLUCOSE UR STRIP-MCNC: NEGATIVE MG/DL
HCT VFR BLD AUTO: 37.7 % (ref 35–47)
HGB BLD-MCNC: 12 G/DL (ref 11.7–15.7)
HGB UR QL STRIP: NEGATIVE
IMM GRANULOCYTES # BLD: 0 10E3/UL
IMM GRANULOCYTES NFR BLD: 0 %
KETONES UR STRIP-MCNC: NEGATIVE MG/DL
LEUKOCYTE ESTERASE UR QL STRIP: ABNORMAL
LIPASE SERPL-CCNC: 124 U/L (ref 73–393)
LYMPHOCYTES # BLD AUTO: 1.8 10E3/UL (ref 0.8–5.3)
LYMPHOCYTES NFR BLD AUTO: 28 %
MCH RBC QN AUTO: 29.9 PG (ref 26.5–33)
MCHC RBC AUTO-ENTMCNC: 31.8 G/DL (ref 31.5–36.5)
MCV RBC AUTO: 94 FL (ref 78–100)
MONOCYTES # BLD AUTO: 0.4 10E3/UL (ref 0–1.3)
MONOCYTES NFR BLD AUTO: 6 %
MUCOUS THREADS #/AREA URNS LPF: PRESENT /LPF
NEUTROPHILS # BLD AUTO: 4.1 10E3/UL (ref 1.6–8.3)
NEUTROPHILS NFR BLD AUTO: 63 %
NITRATE UR QL: NEGATIVE
NRBC # BLD AUTO: 0 10E3/UL
NRBC BLD AUTO-RTO: 0 /100
PH UR STRIP: 6.5 [PH] (ref 5–7)
PLATELET # BLD AUTO: 235 10E3/UL (ref 150–450)
POTASSIUM BLD-SCNC: 3.7 MMOL/L (ref 3.4–5.3)
PROT SERPL-MCNC: 7.2 G/DL (ref 6.8–8.8)
RBC # BLD AUTO: 4.02 10E6/UL (ref 3.8–5.2)
RBC URINE: 0 /HPF
SODIUM SERPL-SCNC: 140 MMOL/L (ref 133–144)
SP GR UR STRIP: 1 (ref 1–1.03)
UROBILINOGEN UR STRIP-MCNC: NORMAL MG/DL
WBC # BLD AUTO: 6.5 10E3/UL (ref 4–11)
WBC URINE: 1 /HPF

## 2023-01-02 PROCEDURE — 81001 URINALYSIS AUTO W/SCOPE: CPT | Performed by: EMERGENCY MEDICINE

## 2023-01-02 PROCEDURE — 80053 COMPREHEN METABOLIC PANEL: CPT | Performed by: EMERGENCY MEDICINE

## 2023-01-02 PROCEDURE — 99285 EMERGENCY DEPT VISIT HI MDM: CPT | Mod: 25

## 2023-01-02 PROCEDURE — 85025 COMPLETE CBC W/AUTO DIFF WBC: CPT | Performed by: EMERGENCY MEDICINE

## 2023-01-02 PROCEDURE — 96374 THER/PROPH/DIAG INJ IV PUSH: CPT | Mod: 59

## 2023-01-02 PROCEDURE — 36415 COLL VENOUS BLD VENIPUNCTURE: CPT | Performed by: EMERGENCY MEDICINE

## 2023-01-02 PROCEDURE — 83690 ASSAY OF LIPASE: CPT | Performed by: EMERGENCY MEDICINE

## 2023-01-03 ENCOUNTER — APPOINTMENT (OUTPATIENT)
Dept: ULTRASOUND IMAGING | Facility: CLINIC | Age: 78
End: 2023-01-03
Attending: EMERGENCY MEDICINE
Payer: COMMERCIAL

## 2023-01-03 ENCOUNTER — HOSPITAL ENCOUNTER (EMERGENCY)
Facility: CLINIC | Age: 78
Discharge: HOME | End: 2023-01-03
Attending: EMERGENCY MEDICINE | Admitting: EMERGENCY MEDICINE
Payer: COMMERCIAL

## 2023-01-03 ENCOUNTER — APPOINTMENT (OUTPATIENT)
Dept: CT IMAGING | Facility: CLINIC | Age: 78
End: 2023-01-03
Attending: EMERGENCY MEDICINE
Payer: COMMERCIAL

## 2023-01-03 VITALS
SYSTOLIC BLOOD PRESSURE: 123 MMHG | WEIGHT: 145 LBS | RESPIRATION RATE: 16 BRPM | OXYGEN SATURATION: 95 % | TEMPERATURE: 97.8 F | BODY MASS INDEX: 30.44 KG/M2 | HEART RATE: 62 BPM | DIASTOLIC BLOOD PRESSURE: 42 MMHG | HEIGHT: 58 IN

## 2023-01-03 DIAGNOSIS — R10.11 ABDOMINAL PAIN, RIGHT UPPER QUADRANT: ICD-10-CM

## 2023-01-03 PROCEDURE — 250N000011 HC RX IP 250 OP 636: Performed by: EMERGENCY MEDICINE

## 2023-01-03 PROCEDURE — 250N000009 HC RX 250: Performed by: EMERGENCY MEDICINE

## 2023-01-03 PROCEDURE — 76705 ECHO EXAM OF ABDOMEN: CPT

## 2023-01-03 PROCEDURE — 74177 CT ABD & PELVIS W/CONTRAST: CPT

## 2023-01-03 RX ORDER — HYDROMORPHONE HYDROCHLORIDE 1 MG/ML
0.5 INJECTION, SOLUTION INTRAMUSCULAR; INTRAVENOUS; SUBCUTANEOUS ONCE
Status: COMPLETED | OUTPATIENT
Start: 2023-01-03 | End: 2023-01-03

## 2023-01-03 RX ORDER — IOPAMIDOL 755 MG/ML
73 INJECTION, SOLUTION INTRAVASCULAR ONCE
Status: COMPLETED | OUTPATIENT
Start: 2023-01-03 | End: 2023-01-03

## 2023-01-03 RX ADMIN — IOPAMIDOL 73 ML: 755 INJECTION, SOLUTION INTRAVENOUS at 04:44

## 2023-01-03 RX ADMIN — HYDROMORPHONE HYDROCHLORIDE 0.5 MG: 1 INJECTION, SOLUTION INTRAMUSCULAR; INTRAVENOUS; SUBCUTANEOUS at 03:08

## 2023-01-03 RX ADMIN — SODIUM CHLORIDE 61 ML: 900 INJECTION INTRAVENOUS at 04:44

## 2023-01-03 ASSESSMENT — ACTIVITIES OF DAILY LIVING (ADL)
ADLS_ACUITY_SCORE: 35
ADLS_ACUITY_SCORE: 33

## 2023-01-03 ASSESSMENT — ENCOUNTER SYMPTOMS
ABDOMINAL PAIN: 1
HEMATURIA: 0

## 2023-01-03 NOTE — ED TRIAGE NOTES
Patient with sudden lower right sided pain that started a few hrs ago. Patient was suppose to have gallbladder surgery Dec 15th but were unable to do because of high blood pressure issues.

## 2023-01-03 NOTE — ED PROVIDER NOTES
"    History     Chief Complaint:  Abdominal Pain       The history is provided by the patient. A  was used (Bruneian).      Dea Burnette is a 77 year old female who presents with right sided abdominal pain. The patient began experiencing throbbing right sided abdominal pain today around . The patient is supposed to get gallbladder surgery but on her pre-op visit she was found to have high blood pressure so they were trying to deal with that prior to surgery. She states that this pain feels different than her previous pain in her gallbladder. Patient denies any hematuria.     ROS:  Review of Systems   Gastrointestinal: Positive for abdominal pain.   Genitourinary: Negative for hematuria.   All other systems reviewed and are negative.      Allergies:  The patient has no known allergies      Medications:    Colace   Aspirin   Lipitor  Cozaar      Past Medical History:    Cholelithiasis   Diverticulitis   Hyperlipidemia   Obesity   Osteoarthritis   Osteopenia   Esophageal stricture   Latent tuberculosis infection   Hypothyroidism   Peripheral artery disease   Hypertension     Past Surgical History:     section   Phacoemulsification clear cornea    Appendectomy     Family History:    Alcoholism   Cancer  Diabetes   Hypertension     Social History:   reports that she has never smoked. She has never used smokeless tobacco. She reports that she does not drink alcohol and does not use drugs.  PCP: aMrcel Carmichael     Physical Exam     Patient Vitals for the past 24 hrs:   BP Temp Temp src Pulse Resp SpO2 Height Weight   23 0530 123/42 -- -- -- 16 95 % -- --   23 0512 (!) 144/52 -- -- -- 16 98 % -- --   23 0400 131/53 -- -- 62 -- 94 % -- --   23 0315 (!) 173/65 -- -- -- -- 100 % -- --   23 0308 -- -- -- -- -- -- 1.473 m (4' 10\") 65.8 kg (145 lb)   23 2317 (!) 202/65 97.8  F (36.6  C) Temporal 69 18 99 % -- --        Physical Exam  General: Resting on " the gurney, appears uncomfortable  Head:  The scalp, face, and head appear normal  Mouth/Throat: Mucus membranes are moist  CV:  Regular rate    Normal S1 and S2  No pathological murmur   Resp:  Breath sounds clear and equal bilaterally    Non-labored, no retractions or accessory muscle use    No coarseness    No wheezing   GI:  Severe right sided abdominal pain on initial evaluation, on repeat evaluation the patients abdomen is soft and non-tender.  MS:  Normal motor assessment of all extremities.    Good capillary refill noted.    Skin:  No rash or lesions noted.  Neuro: Speech is normal and fluent. No apparent deficit.  Psych:  Awake. Alert.  Normal affect.      Appropriate interactions.      Emergency Department Course     Imaging:  CT Abdomen Pelvis w Contrast   Final Result   IMPRESSION:    1.  No inflammatory change, bowel obstruction or abscess. Appendix not seen.   2.  Cholelithiasis.   3.  Diverticulosis.      Abdomen US, limited (RUQ only)   Final Result   IMPRESSION:   1.  A single large gallstone is present. No secondary sonographic signs of cholecystitis.               Report per radiology    Laboratory:  Labs Ordered and Resulted from Time of ED Arrival to Time of ED Departure   ROUTINE UA WITH MICROSCOPIC REFLEX TO CULTURE - Abnormal       Result Value    Color Urine Straw      Appearance Urine Clear      Glucose Urine Negative      Bilirubin Urine Negative      Ketones Urine Negative      Specific Gravity Urine 1.004      Blood Urine Negative      pH Urine 6.5      Protein Albumin Urine Negative      Urobilinogen Urine Normal      Nitrite Urine Negative      Leukocyte Esterase Urine Small (*)     Mucus Urine Present (*)     RBC Urine 0      WBC Urine 1     COMPREHENSIVE METABOLIC PANEL - Abnormal    Sodium 140      Potassium 3.7      Chloride 105      Carbon Dioxide (CO2) 27      Anion Gap 8      Urea Nitrogen 29      Creatinine 0.79      Calcium 9.1      Glucose 113 (*)     Alkaline Phosphatase 71       AST 18      ALT 16      Protein Total 7.2      Albumin 3.3 (*)     Bilirubin Total 0.2      GFR Estimate 77     LIPASE - Normal    Lipase 124     CBC WITH PLATELETS AND DIFFERENTIAL    WBC Count 6.5      RBC Count 4.02      Hemoglobin 12.0      Hematocrit 37.7      MCV 94      MCH 29.9      MCHC 31.8      RDW 12.9      Platelet Count 235      % Neutrophils 63      % Lymphocytes 28      % Monocytes 6      % Eosinophils 2      % Basophils 1      % Immature Granulocytes 0      NRBCs per 100 WBC 0      Absolute Neutrophils 4.1      Absolute Lymphocytes 1.8      Absolute Monocytes 0.4      Absolute Eosinophils 0.1      Absolute Basophils 0.0      Absolute Immature Granulocytes 0.0      Absolute NRBCs 0.0          Emergency Department Course & Assessments:         Interventions:  Medications   HYDROmorphone (PF) (DILAUDID) injection 0.5 mg (0.5 mg Intravenous Given 1/3/23 0308)   iopamidol (ISOVUE-370) solution 73 mL (73 mLs Intravenous Given 1/3/23 0444)   Saline Flush (61 mLs Intravenous Given 1/3/23 0444)        Consultations/Discussion of Management or Tests:  0256 I consulted with the patient and obtained history.       Disposition:  The patient was discharged to home.     Impression & Plan    CMS Diagnoses: None    Medical Decision Making:  Dea Burnette is a 77 year old female presented to the Emergency Department with abdominal pain. The clinical exam today is non-specific. The laboratory testing does not reveal a cause for the patient's pain. CT Imaging is noted to show cholelithiasis, diverticulosis. The exact etiology of the abdominal pain is not clear at this time. The differential diagnosis of abdominal pain includes: Appendicitis, Bowel Obstruction, Ulcer, Ischemia, Cholecystitis, Diverticulitis, Pancreatitis, UTI, kidney stone, Enteritis/Colitis, ovarian cyst, amongst many other etiologies. No life threatening cause or need for emergent surgery or hospital admission is detected today. An  ultrasound was obtained and showed a single large gallstone present, patient will follow up with her primary physician in one day and contact her surgeon to discuss possible cholecystectomy. The patient also understands that if they worsen, they should return to the ER right away. I discussed the uncertainty about the diagnosis and answered the patient's questions. All questions were answered, will follow up with primary MD or return for any change in condition. Discharged in stable condition.     Diagnosis:    ICD-10-CM    1. Abdominal pain, right upper quadrant  R10.11          Scribe Disclosure:  I, Juarez Lomeli, am serving as a scribe at 2:55 AM on 1/3/2023 to document services personally performed by Mary Mcdonnell MD based on my observations and the provider's statements to me.     1/3/2023   Mary Mcdonnell MD Debroux, Karah M, MD  01/03/23 0902

## 2023-01-07 ENCOUNTER — NURSE TRIAGE (OUTPATIENT)
Dept: NURSING | Facility: CLINIC | Age: 78
End: 2023-01-07

## 2023-01-07 ENCOUNTER — HOSPITAL ENCOUNTER (EMERGENCY)
Facility: CLINIC | Age: 78
Discharge: HOME | End: 2023-01-08
Attending: EMERGENCY MEDICINE | Admitting: EMERGENCY MEDICINE
Payer: COMMERCIAL

## 2023-01-07 DIAGNOSIS — I10 BENIGN ESSENTIAL HYPERTENSION: ICD-10-CM

## 2023-01-07 DIAGNOSIS — H11.31 SUBCONJUNCTIVAL HEMORRHAGE OF RIGHT EYE: ICD-10-CM

## 2023-01-07 LAB
ALBUMIN SERPL-MCNC: 3.6 G/DL (ref 3.4–5)
ALP SERPL-CCNC: 76 U/L (ref 40–150)
ALT SERPL W P-5'-P-CCNC: 18 U/L (ref 0–50)
ANION GAP SERPL CALCULATED.3IONS-SCNC: 6 MMOL/L (ref 3–14)
AST SERPL W P-5'-P-CCNC: 34 U/L (ref 0–45)
BILIRUB SERPL-MCNC: 0.3 MG/DL (ref 0.2–1.3)
BUN SERPL-MCNC: 21 MG/DL (ref 7–30)
CALCIUM SERPL-MCNC: 9.3 MG/DL (ref 8.5–10.1)
CHLORIDE BLD-SCNC: 105 MMOL/L (ref 94–109)
CO2 SERPL-SCNC: 30 MMOL/L (ref 20–32)
CREAT SERPL-MCNC: 0.67 MG/DL (ref 0.52–1.04)
GFR SERPL CREATININE-BSD FRML MDRD: 90 ML/MIN/1.73M2
GLUCOSE BLD-MCNC: 105 MG/DL (ref 70–99)
POTASSIUM BLD-SCNC: 5.3 MMOL/L (ref 3.4–5.3)
PROT SERPL-MCNC: 7.7 G/DL (ref 6.8–8.8)
SODIUM SERPL-SCNC: 141 MMOL/L (ref 133–144)

## 2023-01-07 PROCEDURE — 80053 COMPREHEN METABOLIC PANEL: CPT | Performed by: EMERGENCY MEDICINE

## 2023-01-07 PROCEDURE — 250N000013 HC RX MED GY IP 250 OP 250 PS 637: Performed by: EMERGENCY MEDICINE

## 2023-01-07 PROCEDURE — 250N000009 HC RX 250: Performed by: EMERGENCY MEDICINE

## 2023-01-07 PROCEDURE — 99283 EMERGENCY DEPT VISIT LOW MDM: CPT

## 2023-01-07 PROCEDURE — 36415 COLL VENOUS BLD VENIPUNCTURE: CPT | Performed by: EMERGENCY MEDICINE

## 2023-01-07 RX ORDER — PROPARACAINE HYDROCHLORIDE 5 MG/ML
1 SOLUTION/ DROPS OPHTHALMIC ONCE
Status: COMPLETED | OUTPATIENT
Start: 2023-01-07 | End: 2023-01-07

## 2023-01-07 RX ORDER — ACETAMINOPHEN 500 MG
1000 TABLET ORAL ONCE
Status: COMPLETED | OUTPATIENT
Start: 2023-01-07 | End: 2023-01-07

## 2023-01-07 RX ADMIN — ACETAMINOPHEN 1000 MG: 500 TABLET ORAL at 23:49

## 2023-01-07 RX ADMIN — FLUORESCEIN SODIUM 600 MCG: 0.6 STRIP OPHTHALMIC at 23:52

## 2023-01-07 RX ADMIN — PROPARACAINE HYDROCHLORIDE 1 DROP: 5 SOLUTION/ DROPS OPHTHALMIC at 23:49

## 2023-01-07 ASSESSMENT — ACTIVITIES OF DAILY LIVING (ADL): ADLS_ACUITY_SCORE: 35

## 2023-01-08 VITALS
TEMPERATURE: 97.7 F | RESPIRATION RATE: 18 BRPM | OXYGEN SATURATION: 99 % | SYSTOLIC BLOOD PRESSURE: 154 MMHG | HEART RATE: 57 BPM | DIASTOLIC BLOOD PRESSURE: 68 MMHG

## 2023-01-08 LAB
BASOPHILS # BLD AUTO: 0 10E3/UL (ref 0–0.2)
BASOPHILS NFR BLD AUTO: 1 %
EOSINOPHIL # BLD AUTO: 0.1 10E3/UL (ref 0–0.7)
EOSINOPHIL NFR BLD AUTO: 2 %
ERYTHROCYTE [DISTWIDTH] IN BLOOD BY AUTOMATED COUNT: 12.9 % (ref 10–15)
HCT VFR BLD AUTO: 38.8 % (ref 35–47)
HGB BLD-MCNC: 12.4 G/DL (ref 11.7–15.7)
HOLD SPECIMEN: NORMAL
IMM GRANULOCYTES # BLD: 0 10E3/UL
IMM GRANULOCYTES NFR BLD: 0 %
LYMPHOCYTES # BLD AUTO: 1.7 10E3/UL (ref 0.8–5.3)
LYMPHOCYTES NFR BLD AUTO: 27 %
MCH RBC QN AUTO: 29.9 PG (ref 26.5–33)
MCHC RBC AUTO-ENTMCNC: 32 G/DL (ref 31.5–36.5)
MCV RBC AUTO: 94 FL (ref 78–100)
MONOCYTES # BLD AUTO: 0.4 10E3/UL (ref 0–1.3)
MONOCYTES NFR BLD AUTO: 7 %
NEUTROPHILS # BLD AUTO: 3.9 10E3/UL (ref 1.6–8.3)
NEUTROPHILS NFR BLD AUTO: 63 %
NRBC # BLD AUTO: 0 10E3/UL
NRBC BLD AUTO-RTO: 0 /100
PLATELET # BLD AUTO: 218 10E3/UL (ref 150–450)
RBC # BLD AUTO: 4.15 10E6/UL (ref 3.8–5.2)
WBC # BLD AUTO: 6.2 10E3/UL (ref 4–11)

## 2023-01-08 PROCEDURE — 36415 COLL VENOUS BLD VENIPUNCTURE: CPT | Performed by: EMERGENCY MEDICINE

## 2023-01-08 PROCEDURE — 85025 COMPLETE CBC W/AUTO DIFF WBC: CPT | Performed by: EMERGENCY MEDICINE

## 2023-01-08 ASSESSMENT — ACTIVITIES OF DAILY LIVING (ADL): ADLS_ACUITY_SCORE: 35

## 2023-01-08 ASSESSMENT — VISUAL ACUITY
OD: 20/200
OS: 20/80

## 2023-01-08 NOTE — TELEPHONE ENCOUNTER
Nurse Triage SBAR    Situation: Blood pressure     Background: DaughterTerrie, calling. Consent: obtained verbally from patient.    Assessment: Eyes are blood shot. Blurry vision. Blood pressure - 248/108 and recheck 9:04pm 227/107 and 9:22pm 210/106. Eye pain. Back of her neck hurts.     Protocol Recommended Disposition: Emergency Department    Recommendation: According to the protocol, Patient should go to the ED now. Advised Daughter to bring the patient into the ED now. Care advice given. Daughter verbalizes understanding and agrees with plan of care. Reviewed concerning symptoms and when to call 911.      Lashay Hamm RN Nursing Advisor 1/7/2023 9:32 PM     Reason for Disposition    [1] Systolic BP  >= 160 OR Diastolic >= 100 AND [2] cardiac or neurologic symptoms (e.g., chest pain, difficulty breathing, unsteady gait, blurred vision)    Additional Information    Negative: Difficult to awaken or acting confused (e.g., disoriented, slurred speech)    Negative: Severe difficulty breathing (e.g., struggling for each breath, speaks in single words)    Negative: [1] Weakness of the face, arm or leg on one side of the body AND [2] new onset    Negative: [1] Numbness (i.e., loss of sensation) of the face, arm or leg on one side of the body AND [2] new onset    Negative: [1] Chest pain lasts > 5 minutes AND [2] history of heart disease  (i.e., heart attack, bypass surgery, angina, angioplasty, CHF)    Negative: [1] Chest pain AND [2] took nitrogylcerin AND [3] pain was not relieved    Negative: Sounds like a life-threatening emergency to the triager    Negative: Symptom is main concern  (e.g., headache, chest pain)    Negative: Low blood pressure is main concern    Protocols used: HIGH BLOOD PRESSURE-A-

## 2023-01-08 NOTE — ED TRIAGE NOTES
Pt is here for evaluation of high blood pressure and red right eye.     Triage Assessment     Row Name 01/07/23 7570       Triage Assessment (Adult)    Airway WDL WDL       Respiratory WDL    Respiratory WDL WDL       Skin Circulation/Temperature WDL    Skin Circulation/Temperature WDL WDL       Cardiac WDL    Cardiac WDL WDL       Peripheral/Neurovascular WDL    Peripheral Neurovascular WDL WDL       Cognitive/Neuro/Behavioral WDL    Cognitive/Neuro/Behavioral WDL WDL

## 2023-01-08 NOTE — DISCHARGE INSTRUCTIONS
Make sure to take the blood pressure medication every day  The redness of the eye should get better over time

## 2023-01-08 NOTE — ED PROVIDER NOTES
History     Chief Complaint:  Hypertension and Eye Problem       HPI   Dea Burnette is a 77 year old female who presents with hypertension.  Patient's daughter noted that her eye was red this evening.  Patient's daughter initially thought that there is a blood clot in the eye and then this blood clot resolved or possibly ruptured.  At the time patient did not have any pain on her eye.  Later in the evening the daughter decided to check her blood pressure and it was elevated.  She continued to check her blood pressure and it was persistently elevated.  Around that time the patient's daughter reports that the patient was having some irritation of her right eye when she would blink.  She also started to have a mild headache.  No chest pain or shortness of breath.  Patient has been missing doses of her losartan.  Daughter reports that there are more pills than there should be. Reports no vision changes. Wears reading glasses. Had eye trauma as child and had loss of vision at that time. Had surgery and vision improved but still not as good as left eye. Has residual scar on right eye.    Independent Historian: Daughter and patient gave history    Review of External Notes: Reviewed last primary care note on December 22 where her blood pressure was 132/66.  Reviewed her emergency department visit from January 3 where she had a work-up for her right upper quadrant pain which was attributed to cholelithiasis.  Her blood pressure was 173/65 and then improved to 123/42.     ROS:  Review of Systems  + Headache, eye discomfort  Denies chest pain, shortness of breath, fever, abdominal pain    Allergies:  No Known Allergies     Medications:    calcium-vitamin D (CALTRATE) 600-400 MG-UNIT per tablet  docusate sodium (COLACE) 100 MG tablet  PRAVASTATIN SODIUM PO      Past Medical History:    Past Medical History:   Diagnosis Date     Back pain      Cholelithiasis      Diverticulosis      Esophageal stricture       "Hyperlipidemia      Obesity      Osteoarthritis      Osteopenia      Thyroid function study abnormality      Vitamin D deficiency        Past Surgical History:    Past Surgical History:   Procedure Laterality Date      SECTION       Dilation of esophageal stricture       PHACOEMULSIFICATION CLEAR CORNEA WITH STANDARD INTRAOCULAR LENS IMPLANT  2012    Procedure: PHACOEMULSIFICATION CLEAR CORNEA WITH STANDARD INTRAOCULAR LENS IMPLANT;  RIGHT PHACOEMULSIFICATION CLEAR CORNEA WITH STANDARD INTRAOCULAR LENS IMPLANT ;  Surgeon: Blair Mayorga MD;  Location: [facility]     PHACOEMULSIFICATION CLEAR CORNEA WITH STANDARD INTRAOCULAR LENS IMPLANT  2013    Procedure: PHACOEMULSIFICATION CLEAR CORNEA WITH STANDARD INTRAOCULAR LENS IMPLANT;  RIGHT PHACOEMULSIFICATION CLEAR CORNEA WITH STANDARD INTRAOCULAR LENS IMPLANT ;  Surgeon: Blair Mayorga MD;  Location: [facility]        Social History:   reports that she has never smoked. She has never used smokeless tobacco. She reports that she does not drink alcohol and does not use drugs.  PCP: Marcel Carmichael     Physical Exam     Patient Vitals for the past 24 hrs:   BP Temp Temp src Pulse Resp SpO2   23 2236 (!) 182/65 97.7  F (36.5  C) Oral 66 18 98 %        Physical Exam  General: Sitting up in bed  Eyes:  The pupils are equal and round, reactive to light bilaterally. EOMI    Subconjunctival hemorrhage on right eye medial side.     Scar on right eye (baseline)    IOP Right eye 14    On fluorescein exam there is no abrasion, ulcer or evidence of foreign body. There is evidence of the \"scar\" that was present since childhood from prior eye trauma  ENT:    Wearing a mask  Neck:  Normal range of motion  CV:  Regular rate, regular rhythm     Skin warm and well perfused   Resp:  Non labored breathing on room air    No tachypnea    No cough heard    Lungs clears bilaterally  GI:  Abdomen is soft, there is no rigidity    No distension    No rebound tenderness     No " abdominal tenderness  MS:  Normal muscular tone  Skin:  No rash or acute skin lesions noted  Neuro:   Awake, alert.      Speech is normal and fluent.    Face is symmetric.     Moves all extremities equally    Gait normal  Psych: Normal affect.  Appropriate interactions.    Emergency Department Course   ECG:  ECG results from 07/18/21   EKG 12 lead     Value    Systolic Blood Pressure     Diastolic Blood Pressure     Ventricular Rate 60    Atrial Rate 60    NV Interval 180    QRS Duration 82        QTc 448    P Axis 28    R AXIS 17    T Axis 54    Interpretation ECG      Sinus rhythm  Normal ECG  When compared with ECG of 30-OCT-2020 08:31,  No significant change was found  Confirmed by - EMERGENCY ROOM, PHYSICIAN ([id number]),  JEAN CLAUDE IVERSON (Kenneth) on 7/19/2021 7:02:53 AM         Laboratory:  Labs Ordered and Resulted from Time of ED Arrival to Time of ED Departure   COMPREHENSIVE METABOLIC PANEL - Abnormal       Result Value    Sodium 141      Potassium 5.3      Chloride 105      Carbon Dioxide (CO2) 30      Anion Gap 6      Urea Nitrogen 21      Creatinine 0.67      Calcium 9.3      Glucose 105 (*)     Alkaline Phosphatase 76      AST 34      ALT 18      Protein Total 7.7      Albumin 3.6      Bilirubin Total 0.3      GFR Estimate 90     CBC WITH PLATELETS AND DIFFERENTIAL        Emergency Department Course & Assessments:      Interventions:  Medications   proparacaine (ALCAINE) 0.5 % ophthalmic solution 1 drop (1 drop Right Eye Given 1/7/23 2349)   fluorescein (FUL-JENNIFER) ophthalmic strip STRP 600 mcg (600 mcg Right Eye Given 1/7/23 2352)   acetaminophen (TYLENOL) tablet 1,000 mg (1,000 mg Oral Given 1/7/23 2349)        Past medical records, nursing notes, and vitals reviewed.   I performed an exam of the patient and obtained history, as documented above.    Social Determinants of Health affecting care:  Non english speaking    Disposition:  The patient was discharged to home.     Impression & Plan       Medical Decision Making:  Dea Burnette is a 77-year-old female who presented to the emergency department with hypertension.  Patient has a history of hypertension and has missed several doses in the last week as daughter reports that they are more pills than there should be.  She did take it last evening.  Her last blood pressure in clinic was 132/66.  Her last ED visit a few days ago initially had elevated blood pressure but then improved while in the ED.  Basic laboratory studies were obtained and no evidence of endorgan ischemia.  She has no chest pain or shortness of breath.  She has a very mild headache and I do not think imaging of her head is indicated.  Doubt stroke and has a nonfocal exam.  Her right eye has some subconjunctival hemorrhage which may be from the elevated blood pressure.  There is no evidence of more serious cause of eye redness such as glaucoma, infection, ulcer, abrasion, etc.  Her blood pressure improved in the emergency department.  Her visual acuity was abnormal in ED on right eye but this baseline per her report due to her prior injury. She denies vision changes today. Given that her recent blood pressure in clinic was controlled and she has missed doses in the last week, I do not think aggressive initiation of another blood pressure medication is indicated at this time.  She needs to make sure she is taking her blood pressure medicine every day and follow-up in clinic to have repeat blood pressure.  Discussed the subconjunctival hemorrhage with the patient and daughter and unless there is an acute change such as significant eye pain or visual changes, does not require any specific follow-up regarding the eye.    Diagnosis:    ICD-10-CM    1. Benign essential hypertension  I10       2. Subconjunctival hemorrhage of right eye  H11.31          1/7/2023   Bri Gomez MD Goertz, Maria Kristine, MD  01/08/23 0049

## 2023-06-28 ENCOUNTER — APPOINTMENT (OUTPATIENT)
Dept: CT IMAGING | Facility: CLINIC | Age: 78
End: 2023-06-28
Attending: EMERGENCY MEDICINE
Payer: COMMERCIAL

## 2023-06-28 ENCOUNTER — HOSPITAL ENCOUNTER (EMERGENCY)
Facility: CLINIC | Age: 78
Discharge: HOME | End: 2023-06-29
Attending: EMERGENCY MEDICINE | Admitting: EMERGENCY MEDICINE
Payer: COMMERCIAL

## 2023-06-28 DIAGNOSIS — R07.9 CHEST PAIN, UNSPECIFIED TYPE: ICD-10-CM

## 2023-06-28 LAB
ANION GAP SERPL CALCULATED.3IONS-SCNC: 13 MMOL/L (ref 7–15)
BASOPHILS # BLD AUTO: 0 10E3/UL (ref 0–0.2)
BASOPHILS NFR BLD AUTO: 1 %
BUN SERPL-MCNC: 17.5 MG/DL (ref 8–23)
CALCIUM SERPL-MCNC: 9.1 MG/DL (ref 8.8–10.2)
CHLORIDE SERPL-SCNC: 98 MMOL/L (ref 98–107)
CREAT SERPL-MCNC: 0.71 MG/DL (ref 0.51–0.95)
D DIMER PPP FEU-MCNC: 0.86 UG/ML FEU (ref 0–0.5)
DEPRECATED HCO3 PLAS-SCNC: 23 MMOL/L (ref 22–29)
EOSINOPHIL # BLD AUTO: 0.1 10E3/UL (ref 0–0.7)
EOSINOPHIL NFR BLD AUTO: 2 %
ERYTHROCYTE [DISTWIDTH] IN BLOOD BY AUTOMATED COUNT: 12.9 % (ref 10–15)
GFR SERPL CREATININE-BSD FRML MDRD: 87 ML/MIN/1.73M2
GLUCOSE SERPL-MCNC: 150 MG/DL (ref 70–99)
HCT VFR BLD AUTO: 35.6 % (ref 35–47)
HGB BLD-MCNC: 11.7 G/DL (ref 11.7–15.7)
HOLD SPECIMEN: NORMAL
IMM GRANULOCYTES # BLD: 0 10E3/UL
IMM GRANULOCYTES NFR BLD: 0 %
LYMPHOCYTES # BLD AUTO: 2 10E3/UL (ref 0.8–5.3)
LYMPHOCYTES NFR BLD AUTO: 33 %
MCH RBC QN AUTO: 30 PG (ref 26.5–33)
MCHC RBC AUTO-ENTMCNC: 32.9 G/DL (ref 31.5–36.5)
MCV RBC AUTO: 91 FL (ref 78–100)
MONOCYTES # BLD AUTO: 0.3 10E3/UL (ref 0–1.3)
MONOCYTES NFR BLD AUTO: 5 %
NEUTROPHILS # BLD AUTO: 3.6 10E3/UL (ref 1.6–8.3)
NEUTROPHILS NFR BLD AUTO: 59 %
NRBC # BLD AUTO: 0 10E3/UL
NRBC BLD AUTO-RTO: 0 /100
PLATELET # BLD AUTO: 234 10E3/UL (ref 150–450)
POTASSIUM SERPL-SCNC: 3.9 MMOL/L (ref 3.4–5.3)
RBC # BLD AUTO: 3.9 10E6/UL (ref 3.8–5.2)
SODIUM SERPL-SCNC: 134 MMOL/L (ref 136–145)
T4 FREE SERPL-MCNC: 1.19 NG/DL (ref 0.9–1.7)
TROPONIN T SERPL HS-MCNC: 8 NG/L
TSH SERPL DL<=0.005 MIU/L-ACNC: 6.64 UIU/ML (ref 0.3–4.2)
WBC # BLD AUTO: 6 10E3/UL (ref 4–11)

## 2023-06-28 PROCEDURE — 250N000011 HC RX IP 250 OP 636: Performed by: EMERGENCY MEDICINE

## 2023-06-28 PROCEDURE — 84484 ASSAY OF TROPONIN QUANT: CPT | Performed by: EMERGENCY MEDICINE

## 2023-06-28 PROCEDURE — 99285 EMERGENCY DEPT VISIT HI MDM: CPT | Mod: 25

## 2023-06-28 PROCEDURE — 36415 COLL VENOUS BLD VENIPUNCTURE: CPT | Performed by: EMERGENCY MEDICINE

## 2023-06-28 PROCEDURE — 85379 FIBRIN DEGRADATION QUANT: CPT | Performed by: EMERGENCY MEDICINE

## 2023-06-28 PROCEDURE — 71275 CT ANGIOGRAPHY CHEST: CPT

## 2023-06-28 PROCEDURE — 80048 BASIC METABOLIC PNL TOTAL CA: CPT | Performed by: EMERGENCY MEDICINE

## 2023-06-28 PROCEDURE — 85025 COMPLETE CBC W/AUTO DIFF WBC: CPT | Performed by: EMERGENCY MEDICINE

## 2023-06-28 PROCEDURE — 84439 ASSAY OF FREE THYROXINE: CPT | Performed by: EMERGENCY MEDICINE

## 2023-06-28 PROCEDURE — 84443 ASSAY THYROID STIM HORMONE: CPT | Performed by: EMERGENCY MEDICINE

## 2023-06-28 PROCEDURE — 93005 ELECTROCARDIOGRAM TRACING: CPT

## 2023-06-28 PROCEDURE — 87637 SARSCOV2&INF A&B&RSV AMP PRB: CPT | Performed by: EMERGENCY MEDICINE

## 2023-06-28 RX ORDER — IOPAMIDOL 755 MG/ML
500 INJECTION, SOLUTION INTRAVASCULAR ONCE
Status: COMPLETED | OUTPATIENT
Start: 2023-06-28 | End: 2023-06-28

## 2023-06-28 RX ADMIN — IOPAMIDOL 54 ML: 755 INJECTION, SOLUTION INTRAVENOUS at 23:48

## 2023-06-28 ASSESSMENT — ACTIVITIES OF DAILY LIVING (ADL): ADLS_ACUITY_SCORE: 35

## 2023-06-29 VITALS
TEMPERATURE: 97.6 F | DIASTOLIC BLOOD PRESSURE: 69 MMHG | SYSTOLIC BLOOD PRESSURE: 157 MMHG | HEART RATE: 63 BPM | RESPIRATION RATE: 14 BRPM | OXYGEN SATURATION: 96 %

## 2023-06-29 LAB
ATRIAL RATE - MUSE: 79 BPM
DIASTOLIC BLOOD PRESSURE - MUSE: NORMAL MMHG
FLUAV RNA SPEC QL NAA+PROBE: NEGATIVE
FLUBV RNA RESP QL NAA+PROBE: NEGATIVE
INTERPRETATION ECG - MUSE: NORMAL
P AXIS - MUSE: 42 DEGREES
PR INTERVAL - MUSE: 168 MS
QRS DURATION - MUSE: 82 MS
QT - MUSE: 402 MS
QTC - MUSE: 460 MS
R AXIS - MUSE: 42 DEGREES
RSV RNA SPEC NAA+PROBE: NEGATIVE
SARS-COV-2 RNA RESP QL NAA+PROBE: NEGATIVE
SYSTOLIC BLOOD PRESSURE - MUSE: NORMAL MMHG
T AXIS - MUSE: 62 DEGREES
TROPONIN T SERPL HS-MCNC: 10 NG/L
VENTRICULAR RATE- MUSE: 79 BPM

## 2023-06-29 PROCEDURE — 36415 COLL VENOUS BLD VENIPUNCTURE: CPT | Performed by: EMERGENCY MEDICINE

## 2023-06-29 PROCEDURE — 84484 ASSAY OF TROPONIN QUANT: CPT | Performed by: EMERGENCY MEDICINE

## 2023-06-29 ASSESSMENT — ACTIVITIES OF DAILY LIVING (ADL): ADLS_ACUITY_SCORE: 35

## 2023-06-29 NOTE — ED NOTES
"Pt up to [facility] with SBA to void. Pt c/o urgency and \"a little\" dysuria. Pt placed on VS monitoring. Family at bedside  "

## 2023-06-29 NOTE — ED PROVIDER NOTES
History     Chief Complaint:  Chest Pain     The history is limited by a language barrier.  used: daughter.      Dea Burnette is a 78 year old female with a history of hypertension, hyperlipidemia, and peripheral artery disease who presents with chest pain. Per EMS, the patient was having chest pain this evening associated with paresthesias in her arms and face, and upon EMS arrival, she began hyperventilating and having a panic attack. EMS administered four baby Aspirin and 25 mg Benadryl IV. This helped to calm her down. EKG showed NSR with PVCs. Blood pressure 190/80 and blood glucose 155. She has not been compliant with her losartan according to her daughter. Here in the ED, the daughter reports that she got home from work around 191, and the patient told her she had a headache and had not been feeling well. She went outside to get fresh air, and her legs became weak and she had an onset of sharp chest pain, causing her to nearly pass out. Family called EMS. Here in the ED, her chest pain and left arm paresthesias are improved, but she endorses a mild headache and numbness in her feet. She notes taking meloxicam today, which is a new prescription for her. No visual changes, shortness of breath, abdominal pain, back pain, nausea, chills, fever, or cough. No personal history of diabetes or cardiac disease.     Independent Historian:   EMS personnel and daughter, as noted above.     Medications:    Losartan  Meloxicam    Past Medical History:    Hypertension  Peripheral artery disease  Fatty liver  Hypothyroidism  Diverticulosis  Hyperlipidemia  Osteoarthritis  Esophageal stricture  Osteopenia  Hiatal hernia  Lumbar disc herniation with radiculopathy  Latent tuberculosis infection  Lumbar spondylosis    Past Surgical History:     section  Phacoemulsification with IOL  Appendectomy   Dilatation of esophageal stricture  Cholecystectomy      Physical Exam     Patient Vitals for  the past 24 hrs:   BP Temp Temp src Pulse Resp SpO2   06/29/23 0026 -- -- -- -- -- 96 %   06/29/23 0011 126/50 -- -- -- -- 96 %   06/28/23 2356 (!) 150/53 -- -- -- -- --   06/28/23 2336 (!) 146/54 -- -- 65 14 99 %   06/28/23 2321 (!) 142/56 -- -- 65 14 99 %   06/28/23 2309 -- -- -- 68 10 98 %   06/28/23 2308 -- -- -- -- -- 98 %   06/28/23 2307 (!) 155/57 -- -- 67 -- --   06/28/23 2231 -- -- -- 80 18 99 %   06/28/23 2230 -- -- -- -- 22 96 %   06/28/23 2229 -- -- -- 71 14 98 %   06/28/23 2228 -- -- -- 73 16 98 %   06/28/23 2227 -- -- -- 73 23 99 %   06/28/23 2226 -- -- -- 77 19 92 %   06/28/23 2225 -- -- -- 71 (!) 34 97 %   06/28/23 2224 -- -- -- 71 10 99 %   06/28/23 2223 -- -- -- 71 29 98 %   06/28/23 2221 -- -- -- 72 16 100 %   06/28/23 2220 -- -- -- 71 (!) 51 100 %   06/28/23 2219 -- -- -- 72 28 100 %   06/28/23 2218 -- 97.6  F (36.4  C) Oral 77 25 99 %   06/28/23 2214 (!) 155/74 -- -- 72 -- --        Physical Exam  General: Patient is awake, alert  Head: The scalp, face, and head appear normal  Eyes: The pupils are equal, round, and reactive to light. Conjunctivae and sclerae are normal  ENT: External acoustic canals are normal. The oropharynx is normal without erythema. Uvula is in the midline  Neck: Normal range of motion.   CV: Regular rate and rhythm.   Resp: Lungs are clear without wheezes or rales. No respiratory distress.   GI: Abdomen is soft, no rigidity, guarding, or rebound. No distension. No tenderness to palpation in any quadrant.    MS: Normal tone. Joints grossly normal without effusions. No asymmetric leg swelling, calf or thigh tenderness.    Skin: No rash or lesions noted. Normal capillary refill noted  Neuro: Speech is normal and fluent. Face is symmetric. Moving all extremities.   Psych:  anxious appearing.  Appropriate interactions.    Emergency Department Course   ECG  ECG taken at 2224, ECG read at 2239  Normal sinus rhythm   No significant changes as compared to prior, dated 7/18/21.  Rate  79 bpm. GA interval 168 ms. QRS duration 82 ms. QT/QTc 402/460 ms. P-R-T axes 42 42 62.     Imaging:  CT Chest Pulmonary Embolism w Contrast   Final Result   IMPRESSION:   1.  Negative for pulmonary embolism.      2.  No acute airspace infiltrate.      3.  Mild coronary artery disease.         Report per radiology    Laboratory:  Labs Ordered and Resulted from Time of ED Arrival to Time of ED Departure   BASIC METABOLIC PANEL - Abnormal       Result Value    Sodium 134 (*)     Potassium 3.9      Chloride 98      Carbon Dioxide (CO2) 23      Anion Gap 13      Urea Nitrogen 17.5      Creatinine 0.71      Calcium 9.1      Glucose 150 (*)     GFR Estimate 87     D DIMER QUANTITATIVE - Abnormal    D-Dimer Quantitative 0.86 (*)    TSH WITH FREE T4 REFLEX - Abnormal    TSH 6.64 (*)    TROPONIN T, HIGH SENSITIVITY - Normal    Troponin T, High Sensitivity 8     T4 FREE - Normal    Free T4 1.19     INFLUENZA A/B, RSV, & SARS-COV2 PCR - Normal    Influenza A PCR Negative      Influenza B PCR Negative      RSV PCR Negative      SARS CoV2 PCR Negative     TROPONIN T, HIGH SENSITIVITY - Normal    Troponin T, High Sensitivity 10     CBC WITH PLATELETS AND DIFFERENTIAL    WBC Count 6.0      RBC Count 3.90      Hemoglobin 11.7      Hematocrit 35.6      MCV 91      MCH 30.0      MCHC 32.9      RDW 12.9      Platelet Count 234      % Neutrophils 59      % Lymphocytes 33      % Monocytes 5      % Eosinophils 2      % Basophils 1      % Immature Granulocytes 0      NRBCs per 100 WBC 0      Absolute Neutrophils 3.6      Absolute Lymphocytes 2.0      Absolute Monocytes 0.3      Absolute Eosinophils 0.1      Absolute Basophils 0.0      Absolute Immature Granulocytes 0.0      Absolute NRBCs 0.0        Emergency Department Course & Assessments:     Interventions:  Medications   sodium chloride (PF) 0.9% PF flush 100 mL (75 mLs Intravenous $Given 6/28/23 2348)   iopamidol (ISOVUE-370) solution 500 mL (54 mLs Intravenous $Given 6/28/23 2348)       Assessments:  2212 I obtained history and examined the patient as noted above.   0153 Rechecked and updated patient and family on all findings.     Consultations/Discussion of Management or Tests:  None      Social Determinants of Health affecting care:   Language barrier, [place] speaking    Disposition:  The patient was discharged to home.     Impression & Plan      Medical Decision Making:  This is a 78-year-old woman with a past with a history of hypertension, hyperlipidemia and peripheral arterial disease who presents to the emergency department with chest pain and paresthesias in her arm and face.  On initial evaluation here she is mildly hypertensive but otherwise hemodynamically stable with normal vital signs.  She is afebrile and oxygenating well on room air.  Physical exam as detailed above.  No signs of significant respiratory distress.  EKG was obtained which shows no signs of ischemia nor dysrhythmia.  Troponin x2 was obtained and were negative.  Low concern for acute coronary syndrome.  Alternative pathologies were also considered.  D-dimer was obtained and was mildly positive.  Therefore CT PE study was obtained.  Thankfully this was negative for any evidence of pulmonary embolism, pneumonia, pneumothorax, pulmonary edema or pleural effusion.  The remainder of her work-up was very reassuring.  COVID, influenza and RSV were all negative.  No evidence of significant anemia.  Unclear what is causing her symptoms at this time but it does not appear to be sinister in nature at this time.  We will have her call follow-up closely with her primary care team and return to the emergency department with any new or worsening symptoms.    Diagnosis:    ICD-10-CM    1. Chest pain, unspecified type  R07.9             Scribe Disclosure:  I, Ruth Perrin, am serving as a scribe at 10:26 PM on 6/28/2023 to document services personally performed by Chad Anand MD based on my observations and the  provider's statements to me.      6/28/2023   Chad Anand MD Battista, Christopher Joseph, MD  06/29/23 0519

## 2023-06-29 NOTE — ED TRIAGE NOTES
Arrived via EMS. Had CP earlier today. Denies having CP now. Non compliant with HTN meds. EKG enroutemSR with PVC's but EMS stating pt was hyperventilating. 4 baby aspirin and Benadryl given prior to hospital. Let AC 20 g.     Triage Assessment     Row Name 06/28/23 9118       Triage Assessment (Adult)    Airway WDL WDL       Respiratory WDL    Respiratory WDL WDL       Skin Circulation/Temperature WDL    Skin Circulation/Temperature WDL WDL       Cardiac WDL    Cardiac WDL X;chest pain       Chest Pain Assessment    Chest Pain Location midsternal  Not currently but did earlier today    Associated Signs/Symptoms anxiety       Peripheral/Neurovascular WDL    Peripheral Neurovascular WDL WDL       Cognitive/Neuro/Behavioral WDL    Cognitive/Neuro/Behavioral WDL WDL;mood/behavior    Mood/Behavior anxious

## 2023-11-26 ENCOUNTER — HEALTH MAINTENANCE LETTER (OUTPATIENT)
Age: 78
End: 2023-11-26

## 2025-01-04 ENCOUNTER — HEALTH MAINTENANCE LETTER (OUTPATIENT)
Age: 80
End: 2025-01-04

## (undated) RX ORDER — REGADENOSON 0.08 MG/ML
INJECTION, SOLUTION INTRAVENOUS
Status: DISPENSED
Start: 2019-04-02